# Patient Record
Sex: MALE | Race: WHITE | NOT HISPANIC OR LATINO | Employment: FULL TIME | URBAN - METROPOLITAN AREA
[De-identification: names, ages, dates, MRNs, and addresses within clinical notes are randomized per-mention and may not be internally consistent; named-entity substitution may affect disease eponyms.]

---

## 2018-12-10 ENCOUNTER — TELEPHONE (OUTPATIENT)
Dept: UROLOGY | Facility: MEDICAL CENTER | Age: 63
End: 2018-12-10

## 2018-12-10 NOTE — TELEPHONE ENCOUNTER
Complaint/Diagnosis:Frequency    Insurance:[employer]  [employer] [facility]    History of Cancer:NO    Previous Urologist:NO    Outside testing/where:NO    If yes,what kind:NO    Records requested/where:NO    Preferred location:[place]

## 2018-12-26 ENCOUNTER — OFFICE VISIT (OUTPATIENT)
Dept: UROLOGY | Facility: MEDICAL CENTER | Age: 63
End: 2018-12-26
Payer: COMMERCIAL

## 2018-12-26 VITALS
HEART RATE: 78 BPM | HEIGHT: 72 IN | DIASTOLIC BLOOD PRESSURE: 84 MMHG | BODY MASS INDEX: 31.15 KG/M2 | SYSTOLIC BLOOD PRESSURE: 146 MMHG | WEIGHT: 230 LBS

## 2018-12-26 DIAGNOSIS — R35.0 URINARY FREQUENCY: Primary | ICD-10-CM

## 2018-12-26 DIAGNOSIS — N20.0 RENAL STONE: ICD-10-CM

## 2018-12-26 DIAGNOSIS — R97.20 ELEVATED PSA: ICD-10-CM

## 2018-12-26 DIAGNOSIS — N13.8 BPH WITH OBSTRUCTION/LOWER URINARY TRACT SYMPTOMS: ICD-10-CM

## 2018-12-26 DIAGNOSIS — N40.1 BPH WITH OBSTRUCTION/LOWER URINARY TRACT SYMPTOMS: ICD-10-CM

## 2018-12-26 LAB
SL AMB  POCT GLUCOSE, UA: NEGATIVE
SL AMB LEUKOCYTE ESTERASE,UA: NEGATIVE
SL AMB POCT BILIRUBIN,UA: NEGATIVE
SL AMB POCT BLOOD,UA: ABNORMAL
SL AMB POCT CLARITY,UA: CLEAR
SL AMB POCT COLOR,UA: YELLOW
SL AMB POCT KETONES,UA: NEGATIVE
SL AMB POCT NITRITE,UA: NEGATIVE
SL AMB POCT PH,UA: 6.5
SL AMB POCT SPECIFIC GRAVITY,UA: 1.02
SL AMB POCT URINE PROTEIN: NEGATIVE
SL AMB POCT UROBILINOGEN: 0.2

## 2018-12-26 PROCEDURE — 99204 OFFICE O/P NEW MOD 45 MIN: CPT | Performed by: UROLOGY

## 2018-12-26 PROCEDURE — 81003 URINALYSIS AUTO W/O SCOPE: CPT | Performed by: UROLOGY

## 2018-12-26 RX ORDER — ERGOCALCIFEROL 1.25 MG/1
CAPSULE ORAL
Refills: 0 | COMMUNITY
Start: 2018-12-20

## 2018-12-26 RX ORDER — MELOXICAM 15 MG/1
15 TABLET ORAL DAILY PRN
Refills: 0 | COMMUNITY
Start: 2018-12-18

## 2018-12-26 NOTE — ASSESSMENT & PLAN NOTE
Cystoscopy planned  Discontinue alpha blockers  If it turns out that the patient has a distal ureteral stone, this will need to be addressed and his planned cystoscopy in the office can be performed in the hospital during treatment for his stone

## 2018-12-26 NOTE — LETTER
December 26, 2018     MD ANYA Michaud O  [facility]    Patient: Evans Jason   YOB: 1955   Date of Visit: 12/26/2018       Dear Dr Davie Bradley:    Thank you for referring Evans Jason to me for evaluation  Below are my notes for this consultation  If you have questions, please do not hesitate to call me  I look forward to following your patient along with you  Sincerely,        Campbell Stone MD        CC: No Recipients  Campbell Stone MD  12/26/2018  5:15 PM  Sign at close encounter  Assessment/Plan:    Renal stone  Considering his worsening urinary symptoms, left hydronephrosis seen on ultrasound, presence of a stone in the left kidney and stone history, the patient may have a distal ureteral stone contributing to his symptom complex  A CT stone study has been ordered  BPH with obstruction/lower urinary tract symptoms  Cystoscopy planned  Discontinue alpha blockers  If it turns out that the patient has a distal ureteral stone, this will need to be addressed and his planned cystoscopy in the office can be performed in the hospital during treatment for his stone  Elevated PSA  Repeat total and free PSA after primary problems have resolved  Diagnoses and all orders for this visit:    Urinary frequency  -     POCT urine dip auto non-scope    BPH with obstruction/lower urinary tract symptoms    Renal stone  -     CT renal stone study abdomen pelvis wo contrast; Future    Elevated PSA          Subjective:      Patient ID: Evans Jason is a 58 y o  male  HPI     Elevated PSA:  PSA was 4 1 in July and 4 26 in October  Initially, the patient was told by another urologist that he needed a biopsy  When the 2nd PSA result was available, the patient was told by his other urologist that his rate of rise of PSA is consistent with benign disease in that he does not have prostate cancer  The patient is left both concerned and confused      At this point, the patient's voiding cysts symptoms are higher priority and his mild elevation in PSA  A total and free PSA will be repeated after resolution of his current problems  BPH:  He notes urinary frequency, urinary urgency  He denies other significant urinary symptoms  He denies gross hematuria, urinary tract infections or incontinence  He is taking neither medications nor supplements for his symptoms  Major complaint is frequency  No pain  Fluids go right through him  Urgency is also interfering with ADL's  Sx for at least a year and probably 2, per family  Has had urge incontinence  Alpha blockers have helped sx but caused severe muscle and joint pain  Renal u/s showed PVR=77 cc and left hydro with left lower pole stone    Bladder scan was not performed because the machine is not working reliably  AUA SYMPTOM SCORE      Most Recent Value   AUA SYMPTOM SCORE   How often have you had a sensation of not emptying your bladder completely after you finished urinating? 0   How often have you had to urinate again less than two hours after you finished urinating? 4   How often have you found you stopped and started again several times when you urinate?  0   How often have you found it difficult to postpone urination? 2   How often have you had a weak urinary stream?  1   How often have you had to push or strain to begin urination? 0   How many times did you most typically get up to urinate from the time you went to bed at night until the time you got up in the morning? 2   Quality of Life: If you were to spend the rest of your life with your urinary condition just the way it is now, how would you feel about that?  5   AUA SYMPTOM SCORE  9          Renal Stone: The patient's stone was identified on a recent ultrasound  The patient has a remote history of renal colic    Considering his worsening urinary symptoms, left hydronephrosis seen on ultrasound, presence of a stone in the left kidney and stone history, the patient may have a distal ureteral stone contributing to his symptom complex  A CT stone study has been ordered  The patient is accompanied by his wife and daughter  The following portions of the patient's history were reviewed and updated as appropriate: allergies, current medications, past family history, past medical history, past social history, past surgical history and problem list     Review of Systems   Constitutional: Negative for activity change and fatigue  Respiratory: Negative for shortness of breath and wheezing  Cardiovascular: Negative for chest pain  Gastrointestinal: Negative for abdominal pain  Genitourinary: Negative for difficulty urinating, dysuria, frequency, hematuria and urgency  Musculoskeletal: Negative for back pain and gait problem  Skin: Negative  Allergic/Immunologic: Negative  Neurological: Negative  Psychiatric/Behavioral: Negative  Objective:      /84 (BP Location: Left arm, Patient Position: Sitting, Cuff Size: Standard)   Pulse 78   Ht 6' (1 829 m)   Wt 104 kg (230 lb)   BMI 31 19 kg/m²           Physical Exam   Constitutional: He is oriented to person, place, and time  He appears well-developed and well-nourished  HENT:   Head: Normocephalic and atraumatic  Eyes: EOM are normal    Neck: Normal range of motion  Neck supple  Cardiovascular: Normal rate, regular rhythm and normal heart sounds  Pulmonary/Chest: Effort normal and breath sounds normal    Abdominal: Soft  There is no tenderness  Genitourinary:   Genitourinary Comments: The prostate is approximately 40 g, firm, smooth, non-tender  Normal external genitalia  Musculoskeletal: Normal range of motion  Neurological: He is alert and oriented to person, place, and time  Skin: Skin is warm and dry  Psychiatric: He has a normal mood and affect   His behavior is normal  Judgment and thought content normal

## 2018-12-26 NOTE — PROGRESS NOTES
Assessment/Plan:    No problem-specific Assessment & Plan notes found for this encounter  {Assess/PlanSmartLinks:55242}      Subjective:      Patient ID: Chepe Jhaveri is a 58 y o  male      HPI    {Common ambulatory SmartLinks:15238}    Review of Systems      Objective:      /84 (BP Location: Left arm, Patient Position: Sitting, Cuff Size: Standard)   Pulse 78   Ht 6' (1 829 m)   Wt 104 kg (230 lb)   BMI 31 19 kg/m²          Physical Exam

## 2018-12-26 NOTE — PROGRESS NOTES
Assessment/Plan:    Renal stone  Considering his worsening urinary symptoms, left hydronephrosis seen on ultrasound, presence of a stone in the left kidney and stone history, the patient may have a distal ureteral stone contributing to his symptom complex  A CT stone study has been ordered  BPH with obstruction/lower urinary tract symptoms  Cystoscopy planned  Discontinue alpha blockers  If it turns out that the patient has a distal ureteral stone, this will need to be addressed and his planned cystoscopy in the office can be performed in the hospital during treatment for his stone  Elevated PSA  Repeat total and free PSA after primary problems have resolved  Diagnoses and all orders for this visit:    Urinary frequency  -     POCT urine dip auto non-scope    BPH with obstruction/lower urinary tract symptoms    Renal stone  -     CT renal stone study abdomen pelvis wo contrast; Future    Elevated PSA          Subjective:      Patient ID: Mj Bullock is a 58 y o  male  HPI     Elevated PSA:  PSA was 4 1 in July and 4 26 in October  Initially, the patient was told by another urologist that he needed a biopsy  When the 2nd PSA result was available, the patient was told by his other urologist that his rate of rise of PSA is consistent with benign disease in that he does not have prostate cancer  The patient is left both concerned and confused  At this point, the patient's voiding cysts symptoms are higher priority and his mild elevation in PSA  A total and free PSA will be repeated after resolution of his current problems  BPH:  He notes urinary frequency, urinary urgency  He denies other significant urinary symptoms  He denies gross hematuria, urinary tract infections or incontinence  He is taking neither medications nor supplements for his symptoms  Major complaint is frequency  No pain  Fluids go right through him  Urgency is also interfering with ADL's    Sx for at least a year and probably 2, per family  Has had urge incontinence  Alpha blockers have helped sx but caused severe muscle and joint pain  Renal u/s showed PVR=77 cc and left hydro with left lower pole stone    Bladder scan was not performed because the machine is not working reliably  AUA SYMPTOM SCORE      Most Recent Value   AUA SYMPTOM SCORE   How often have you had a sensation of not emptying your bladder completely after you finished urinating? 0   How often have you had to urinate again less than two hours after you finished urinating? 4   How often have you found you stopped and started again several times when you urinate?  0   How often have you found it difficult to postpone urination? 2   How often have you had a weak urinary stream?  1   How often have you had to push or strain to begin urination? 0   How many times did you most typically get up to urinate from the time you went to bed at night until the time you got up in the morning? 2   Quality of Life: If you were to spend the rest of your life with your urinary condition just the way it is now, how would you feel about that?  5   AUA SYMPTOM SCORE  9          Renal Stone: The patient's stone was identified on a recent ultrasound  The patient has a remote history of renal colic  Considering his worsening urinary symptoms, left hydronephrosis seen on ultrasound, presence of a stone in the left kidney and stone history, the patient may have a distal ureteral stone contributing to his symptom complex  A CT stone study has been ordered  The patient is accompanied by his wife and daughter  The following portions of the patient's history were reviewed and updated as appropriate: allergies, current medications, past family history, past medical history, past social history, past surgical history and problem list     Review of Systems   Constitutional: Negative for activity change and fatigue     Respiratory: Negative for shortness of breath and wheezing  Cardiovascular: Negative for chest pain  Gastrointestinal: Negative for abdominal pain  Genitourinary: Negative for difficulty urinating, dysuria, frequency, hematuria and urgency  Musculoskeletal: Negative for back pain and gait problem  Skin: Negative  Allergic/Immunologic: Negative  Neurological: Negative  Psychiatric/Behavioral: Negative  Objective:      /84 (BP Location: Left arm, Patient Position: Sitting, Cuff Size: Standard)   Pulse 78   Ht 6' (1 829 m)   Wt 104 kg (230 lb)   BMI 31 19 kg/m²          Physical Exam   Constitutional: He is oriented to person, place, and time  He appears well-developed and well-nourished  HENT:   Head: Normocephalic and atraumatic  Eyes: EOM are normal    Neck: Normal range of motion  Neck supple  Cardiovascular: Normal rate, regular rhythm and normal heart sounds  Pulmonary/Chest: Effort normal and breath sounds normal    Abdominal: Soft  There is no tenderness  Genitourinary:   Genitourinary Comments: The prostate is approximately 40 g, firm, smooth, non-tender  Normal external genitalia  Musculoskeletal: Normal range of motion  Neurological: He is alert and oriented to person, place, and time  Skin: Skin is warm and dry  Psychiatric: He has a normal mood and affect   His behavior is normal  Judgment and thought content normal

## 2018-12-26 NOTE — ASSESSMENT & PLAN NOTE
Considering his worsening urinary symptoms, left hydronephrosis seen on ultrasound, presence of a stone in the left kidney and stone history, the patient may have a distal ureteral stone contributing to his symptom complex  A CT stone study has been ordered

## 2019-01-12 ENCOUNTER — HOSPITAL ENCOUNTER (OUTPATIENT)
Dept: CT IMAGING | Facility: HOSPITAL | Age: 64
Discharge: HOME | End: 2019-01-12
Attending: UROLOGY
Payer: COMMERCIAL

## 2019-01-12 DIAGNOSIS — N20.0 RENAL STONE: ICD-10-CM

## 2019-01-12 PROCEDURE — 74176 CT ABD & PELVIS W/O CONTRAST: CPT

## 2019-01-14 ENCOUNTER — TELEPHONE (OUTPATIENT)
Dept: UROLOGY | Facility: MEDICAL CENTER | Age: 64
End: 2019-01-14

## 2019-01-14 NOTE — TELEPHONE ENCOUNTER
Spoke with the patient; Informed him of the non-obstructing 2mm stone in his left kidney as well as the cyst  Both are okay per Dr Octavio Castro  Date and time of patient's next appointment have been confirmed

## 2019-01-14 NOTE — TELEPHONE ENCOUNTER
----- Message from Jaylene Lam MD sent at 1/14/2019  2:25 PM EST -----  CT stones for stones shows a 2 mm stone in the lower pole of the left kidney  There is no obstruction  There is a cyst on the kidney which is not clinically important however, because of its location, it was mistaken for a sign of kidney obstruction  This happen sometimes  Inform pt

## 2019-01-29 ENCOUNTER — PROCEDURE VISIT (OUTPATIENT)
Dept: UROLOGY | Facility: MEDICAL CENTER | Age: 64
End: 2019-01-29
Payer: COMMERCIAL

## 2019-01-29 VITALS
DIASTOLIC BLOOD PRESSURE: 82 MMHG | HEART RATE: 73 BPM | SYSTOLIC BLOOD PRESSURE: 148 MMHG | BODY MASS INDEX: 30.48 KG/M2 | WEIGHT: 225 LBS | HEIGHT: 72 IN

## 2019-01-29 DIAGNOSIS — R35.0 URINARY FREQUENCY: ICD-10-CM

## 2019-01-29 DIAGNOSIS — Z71.89 OTHER SPECIFIED COUNSELING: ICD-10-CM

## 2019-01-29 DIAGNOSIS — N20.0 CALCULUS OF KIDNEY: ICD-10-CM

## 2019-01-29 DIAGNOSIS — R97.20 ELEVATED PSA: ICD-10-CM

## 2019-01-29 DIAGNOSIS — N40.1 BPH WITH OBSTRUCTION/LOWER URINARY TRACT SYMPTOMS: Primary | ICD-10-CM

## 2019-01-29 DIAGNOSIS — N13.8 BPH WITH OBSTRUCTION/LOWER URINARY TRACT SYMPTOMS: Primary | ICD-10-CM

## 2019-01-29 LAB
SL AMB  POCT GLUCOSE, UA: NEGATIVE
SL AMB LEUKOCYTE ESTERASE,UA: 8
SL AMB POCT BILIRUBIN,UA: NEGATIVE
SL AMB POCT BLOOD,UA: NEGATIVE
SL AMB POCT CLARITY,UA: CLEAR
SL AMB POCT COLOR,UA: YELLOW
SL AMB POCT KETONES,UA: NEGATIVE
SL AMB POCT NITRITE,UA: NEGATIVE
SL AMB POCT PH,UA: 1.01
SL AMB POCT SPECIFIC GRAVITY,UA: 1.01
SL AMB POCT URINE PROTEIN: NORMAL
SL AMB POCT UROBILINOGEN: 0.2

## 2019-01-29 PROCEDURE — 52000 CYSTOURETHROSCOPY: CPT | Performed by: UROLOGY

## 2019-01-29 PROCEDURE — 81003 URINALYSIS AUTO W/O SCOPE: CPT | Performed by: UROLOGY

## 2019-01-29 PROCEDURE — 99214 OFFICE O/P EST MOD 30 MIN: CPT | Performed by: UROLOGY

## 2019-01-29 NOTE — PROGRESS NOTES
Assessment/Plan:    Elevated PSA  Obtain total and free PSA now  BPH with obstruction/lower urinary tract symptoms  Symptoms were actually less since discontinuing tamsulosin  CPAP should help with the nocturia and decreasing his fluid intake and caffeine intake should help with the urinary frequency  Overall, the patient is much less symptomatic than most of his peers  Calculus of kidney  2 millimeter left renal stone which appears to be imbedded in tissue  No treatment indicated  Diagnoses and all orders for this visit:    BPH with obstruction/lower urinary tract symptoms    Calculus of kidney    Elevated PSA  -     PSA, total and free; Future    Urinary frequency  -     POCT urine dip auto non-scope    Other specified counseling          Subjective:      Patient ID: Deric Westbrook is a 58 y o  male  HPI  BPH:  He notes nocturia x 2 and daytime frequency  He denies other significant urinary symptoms  He denies gross hematuria, urinary tract infections or incontinence  He is taking neither medications nor supplements for his symptoms  Major complaint is nocturia  Getting CPAP machine this week  Sleep apnea may contribute to sleep apnea and the CPAP machine may help a lot  Drinks 2- 20 oz cups of caffeinated coffee daily and has frequency  This can certainly based upon his caffeine intake and sheer volume alone  AUA SYMPTOM SCORE      Most Recent Value   AUA SYMPTOM SCORE   How often have you had a sensation of not emptying your bladder completely after you finished urinating? 0   How often have you had to urinate again less than two hours after you finished urinating? 2   How often have you found you stopped and started again several times when you urinate? 1   How often have you found it difficult to postpone urination? 1   How often have you had a weak urinary stream?  1   How often have you had to push or strain to begin urination?   0   How many times did you most typically get up to urinate from the time you went to bed at night until the time you got up in the morning? 2   Quality of Life: If you were to spend the rest of your life with your urinary condition just the way it is now, how would you feel about that?  5   AUA SYMPTOM SCORE  7            Procedures  MALE FLEXIBLE CYSTOSCOPY    Preoperative diagnosis:  Difficulty voiding    Postoperative diagnosis:  Same, mild BPH    Operation:  Flexible cystoscopy    Surgeon:  Mary Kowalski MD,FACS    Anesthesia:  Xylocaine jelly    Procedure:  Patient was brought to the cystoscopy room and positively identified  The patient was prepped and draped in sterile fashion  Ten mL of 1% xylocaine jelly was instilled into the urethra  A penile clamp was applied  The flexible cystoscope was inserted  Findings are as follows:    Penile Urethra:  normal  Bulbar Urethra:  normal  Prostate:  Symmetrical enlargement of the lateral lobes with obstruction for approximately 1 cm  Bladder:   Bladder Neck:  Normal  Ureteral orifices:   Normal  Mucosa:   Normal     Trabeculation:  mild   PVR:  Minimal      The cystoscope was removed  The patient tolerated the procedure well  Following additional consultation to review the findings with the patient, he was discharged from the office in satisfactory condition  Impression:  Mild BPH  Renal stone:  2 mm nonobstructing stone in the left lower pole  No treatment needed  No convincing evidence of hydronephrosis by CT scan, suspected parapelvic cyst which may have led to the interpretation of hydronephrosis on ultrasound  Elevated PSA:  Notes from office visit of December 26, 2018:  PSA was 4 1 in July and 4 26 in October  Initially, the patient was told by another urologist that he needed a biopsy  When the 2nd PSA result was available, the patient was told by his other urologist that his rate of rise of PSA is consistent with benign disease in that he does not have prostate cancer  The patient is left both concerned and confused  Based upon previous records from the patient's former urologist, his PSA was 1 46 in January 2015  Calculated PSA doubling time is 2 0 4 years  Without intervening lab work, it is difficult to know whether the patient's PSA is rising in a linear or geometric rate  We will obtain a total and free PSA and go from there  The patient is accompanied by his wife for the consultation portion of the visit which was more than 50 percent of the total visit  The following portions of the patient's history were reviewed and updated as appropriate: allergies, current medications, past family history, past medical history, past social history, past surgical history and problem list     Review of Systems   Constitutional: Negative for activity change and fatigue  Respiratory: Negative for shortness of breath and wheezing  Cardiovascular: Negative for chest pain  Gastrointestinal: Negative for abdominal pain  Genitourinary: Negative for difficulty urinating, dysuria, frequency, hematuria and urgency  Musculoskeletal: Negative for back pain and gait problem  Skin: Negative  Allergic/Immunologic: Negative  Neurological: Negative  Psychiatric/Behavioral: Negative  Objective:      /82 (BP Location: Left arm, Patient Position: Sitting, Cuff Size: Standard)   Pulse 73   Ht 6' (1 829 m)   Wt 102 kg (225 lb)   BMI 30 52 kg/m²          Physical Exam   Constitutional: He is oriented to person, place, and time  He appears well-developed and well-nourished  HENT:   Head: Normocephalic and atraumatic  Eyes: EOM are normal    Neck: Normal range of motion  Pulmonary/Chest: Effort normal    Genitourinary:   Genitourinary Comments: Normal external genitalia  Musculoskeletal: Normal range of motion  Neurological: He is alert and oriented to person, place, and time  Skin: Skin is warm and dry     Psychiatric: He has a normal mood and affect   His behavior is normal  Judgment and thought content normal

## 2019-01-29 NOTE — ASSESSMENT & PLAN NOTE
Symptoms were actually less since discontinuing tamsulosin  CPAP should help with the nocturia and decreasing his fluid intake and caffeine intake should help with the urinary frequency  Overall, the patient is much less symptomatic than most of his peers

## 2019-01-29 NOTE — LETTER
January 29, 2019     MD ANYA [facility]    Patient: Denise Hernandes   YOB: 1955   Date of Visit: 1/29/2019       Dear Dr Wander Mcmullen:    Thank you for referring Denise Hernandes to me for evaluation  Below are my notes for this consultation  If you have questions, please do not hesitate to call me  I look forward to following your patient along with you  Sincerely,        Lisa Henderson MD        CC: No Recipients  Lisa Henderson MD  1/29/2019 12:39 PM  Sign at close encounter  Assessment/Plan:    Elevated PSA  Obtain total and free PSA now  BPH with obstruction/lower urinary tract symptoms  Symptoms were actually less since discontinuing tamsulosin  CPAP should help with the nocturia and decreasing his fluid intake and caffeine intake should help with the urinary frequency  Overall, the patient is much less symptomatic than most of his peers  Calculus of kidney  2 millimeter left renal stone which appears to be imbedded in tissue  No treatment indicated  Diagnoses and all orders for this visit:    BPH with obstruction/lower urinary tract symptoms    Calculus of kidney    Elevated PSA  -     PSA, total and free; Future    Urinary frequency  -     POCT urine dip auto non-scope    Other specified counseling          Subjective:      Patient ID: Denise Hernandes is a 58 y o  male  HPI  BPH:  He notes nocturia x 2 and daytime frequency  He denies other significant urinary symptoms  He denies gross hematuria, urinary tract infections or incontinence  He is taking neither medications nor supplements for his symptoms  Major complaint is nocturia  Getting CPAP machine this week  Sleep apnea may contribute to sleep apnea and the CPAP machine may help a lot  Drinks 2- 20 oz cups of caffeinated coffee daily and has frequency  This can certainly based upon his caffeine intake and sheer volume alone      AUA SYMPTOM SCORE      Most Recent Value   AUA SYMPTOM SCORE   How often have you had a sensation of not emptying your bladder completely after you finished urinating? 0   How often have you had to urinate again less than two hours after you finished urinating? 2   How often have you found you stopped and started again several times when you urinate? 1   How often have you found it difficult to postpone urination? 1   How often have you had a weak urinary stream?  1   How often have you had to push or strain to begin urination? 0   How many times did you most typically get up to urinate from the time you went to bed at night until the time you got up in the morning? 2   Quality of Life: If you were to spend the rest of your life with your urinary condition just the way it is now, how would you feel about that?  5   AUA SYMPTOM SCORE  7            Procedures  MALE FLEXIBLE CYSTOSCOPY    Preoperative diagnosis:  Difficulty voiding    Postoperative diagnosis:  Same, mild BPH    Operation:  Flexible cystoscopy    Surgeon:  Comfort Jean MD,FACS    Anesthesia:  Xylocaine jelly    Procedure:  Patient was brought to the cystoscopy room and positively identified  The patient was prepped and draped in sterile fashion  Ten mL of 1% xylocaine jelly was instilled into the urethra  A penile clamp was applied  The flexible cystoscope was inserted  Findings are as follows:    Penile Urethra:  normal  Bulbar Urethra:  normal  Prostate:  Symmetrical enlargement of the lateral lobes with obstruction for approximately 1 cm  Bladder:   Bladder Neck:  Normal  Ureteral orifices:   Normal  Mucosa:   Normal     Trabeculation:  mild   PVR:  Minimal      The cystoscope was removed  The patient tolerated the procedure well  Following additional consultation to review the findings with the patient, he was discharged from the office in satisfactory condition  Impression:  Mild BPH  Renal stone:  2 mm nonobstructing stone in the left lower pole    No treatment needed  No convincing evidence of hydronephrosis by CT scan, suspected parapelvic cyst which may have led to the interpretation of hydronephrosis on ultrasound  Elevated PSA:  Notes from office visit of December 26, 2018:  PSA was 4 1 in July and 4 26 in October  Initially, the patient was told by another urologist that he needed a biopsy  When the 2nd PSA result was available, the patient was told by his other urologist that his rate of rise of PSA is consistent with benign disease in that he does not have prostate cancer  The patient is left both concerned and confused  Based upon previous records from the patient's former urologist, his PSA was 1 46 in January 2015  Calculated PSA doubling time is 2 0 4 years  Without intervening lab work, it is difficult to know whether the patient's PSA is rising in a linear or geometric rate  We will obtain a total and free PSA and go from there  The patient is accompanied by his wife for the consultation portion of the visit which was more than 50 percent of the total visit  The following portions of the patient's history were reviewed and updated as appropriate: allergies, current medications, past family history, past medical history, past social history, past surgical history and problem list     Review of Systems   Constitutional: Negative for activity change and fatigue  Respiratory: Negative for shortness of breath and wheezing  Cardiovascular: Negative for chest pain  Gastrointestinal: Negative for abdominal pain  Genitourinary: Negative for difficulty urinating, dysuria, frequency, hematuria and urgency  Musculoskeletal: Negative for back pain and gait problem  Skin: Negative  Allergic/Immunologic: Negative  Neurological: Negative  Psychiatric/Behavioral: Negative            Objective:      /82 (BP Location: Left arm, Patient Position: Sitting, Cuff Size: Standard)   Pulse 73   Ht 6' (1 829 m)   Wt 102 kg (225 lb) BMI 30 52 kg/m²           Physical Exam   Constitutional: He is oriented to person, place, and time  He appears well-developed and well-nourished  HENT:   Head: Normocephalic and atraumatic  Eyes: EOM are normal    Neck: Normal range of motion  Pulmonary/Chest: Effort normal    Genitourinary:   Genitourinary Comments: Normal external genitalia  Musculoskeletal: Normal range of motion  Neurological: He is alert and oriented to person, place, and time  Skin: Skin is warm and dry  Psychiatric: He has a normal mood and affect   His behavior is normal  Judgment and thought content normal

## 2019-02-15 ENCOUNTER — TELEPHONE (OUTPATIENT)
Dept: UROLOGY | Facility: MEDICAL CENTER | Age: 64
End: 2019-02-15

## 2019-02-15 NOTE — TELEPHONE ENCOUNTER
[facility] called to see if fax was received for patient labs  Did not see it scanned in so [facility] sent labs    Took to clinical team

## 2019-02-18 DIAGNOSIS — R97.20 ELEVATED PSA: Primary | ICD-10-CM

## 2019-02-18 RX ORDER — DOXYCYCLINE 100 MG/1
100 CAPSULE ORAL 2 TIMES DAILY
Qty: 28 CAPSULE | Refills: 0 | Status: SHIPPED | OUTPATIENT
Start: 2019-02-18 | End: 2019-03-04

## 2019-02-18 NOTE — TELEPHONE ENCOUNTER
Kt Borrego MD  [facility] [facility]             PSA nl   Inform pt   Current PSA is 9 2, 20% free, 20% chance of prostate cancer   We have no prior PSAs for comparison   Please ask the patient if he has any PSAs from any other source or doctor   If not, this PSA needs to be repeated in about 1 month following a 2 week course of doxycycline   Labs and antibiotics ordered  Adrian Multani you  Pt notified of PSA results  Understood to finish 2 week course of antibiotics then wait 2 weeks to repeat PSA at [facility]

## 2019-03-19 ENCOUNTER — APPOINTMENT (OUTPATIENT)
Dept: LAB | Facility: CLINIC | Age: 64
End: 2019-03-19
Payer: COMMERCIAL

## 2019-03-19 ENCOUNTER — TRANSCRIBE ORDERS (OUTPATIENT)
Dept: ADMINISTRATIVE | Facility: HOSPITAL | Age: 64
End: 2019-03-19

## 2019-03-19 DIAGNOSIS — R97.20 ELEVATED PSA: ICD-10-CM

## 2019-03-19 PROCEDURE — 84153 ASSAY OF PSA TOTAL: CPT

## 2019-03-19 PROCEDURE — 84154 ASSAY OF PSA FREE: CPT

## 2019-03-19 PROCEDURE — 36415 COLL VENOUS BLD VENIPUNCTURE: CPT

## 2019-03-20 LAB
PSA FREE MFR SERPL: 14.6 %
PSA FREE SERPL-MCNC: 0.6 NG/ML
PSA SERPL-MCNC: 4.1 NG/ML (ref 0–4)

## 2019-03-21 ENCOUNTER — TELEPHONE (OUTPATIENT)
Dept: UROLOGY | Facility: CLINIC | Age: 64
End: 2019-03-21

## 2019-03-22 NOTE — TELEPHONE ENCOUNTER
Pt notified PSA is 4 1, was 9 2 in Feb  Pt now questioning when he should return to office  Will direct to Dr Pantera Mora

## 2019-03-25 ENCOUNTER — TELEPHONE (OUTPATIENT)
Dept: UROLOGY | Facility: MEDICAL CENTER | Age: 64
End: 2019-03-25

## 2019-03-25 NOTE — TELEPHONE ENCOUNTER
Notified pt of PSA and ct results  Per Dr Willingham Friends he would like pt to have a cysto  Next available is 4/30 at 830   Pt scheduled appt for that time

## 2019-03-25 NOTE — TELEPHONE ENCOUNTER
----- Message from Celestino Porras MD sent at 3/23/2019 11:09 AM EDT -----  The patient's PSA has been stable for the past year at 4 1  Although this is mildly abnormal, it has been stable  I do not think he needs a prostate biopsy now  I reviewed his CT stone study  He has a 2 mm stone in the left kidney which appears to be imbedded in the tissue and not free to move or cause trouble  In order to evaluate his urinary symptoms, I would like to perform cystoscopy in the office  Please inform the patient and arrange this  Thank you

## 2019-03-29 NOTE — TELEPHONE ENCOUNTER
Spoke to pt  Per Dr Imelda Barthel, pt was scoped in January of this year  No need to scope in April since PSA has returned to base level Pt reports he still has freq at times q 1 hour in which cold aggravates the symptom and recently dev nocturia x's 1  Will direct to Dr Imelda Barthel re: treatment

## 2019-04-03 ENCOUNTER — TELEPHONE (OUTPATIENT)
Dept: UROLOGY | Facility: CLINIC | Age: 64
End: 2019-04-03

## 2021-08-17 ENCOUNTER — TELEPHONE (OUTPATIENT)
Dept: UROLOGY | Facility: CLINIC | Age: 66
End: 2021-08-17

## 2021-08-17 NOTE — TELEPHONE ENCOUNTER
Patient of Dr Shailesh Apodaca, last seen in 4/2019, left message on voicemail line, needing to make an appointment for follow up care and asking if he any labs done

## 2021-08-17 NOTE — TELEPHONE ENCOUNTER
When I called back, patient had no clue why Anastacio Micheal called for him  Patient has not been seen since 04/2019  I made him a follow up appointment with Dr Reena Rashid on 10/01, and patient wants to know if he should get a PSA done? If he needs one done please place order and let patient know

## 2021-08-19 ENCOUNTER — TELEPHONE (OUTPATIENT)
Dept: UROLOGY | Facility: MEDICAL CENTER | Age: 66
End: 2021-08-19

## 2021-08-19 DIAGNOSIS — R97.20 ELEVATED PSA: Primary | ICD-10-CM

## 2021-08-19 NOTE — TELEPHONE ENCOUNTER
Spoke to pt and advised he go for PSA test   Order placed in chart  Advised he would be called with results    He has appt on 10/1/21 with Dr Da Roman

## 2021-09-13 ENCOUNTER — TELEPHONE (OUTPATIENT)
Dept: UROLOGY | Facility: MEDICAL CENTER | Age: 66
End: 2021-09-13

## 2021-11-02 ENCOUNTER — TELEPHONE (OUTPATIENT)
Dept: UROLOGY | Facility: MEDICAL CENTER | Age: 66
End: 2021-11-02

## 2021-11-24 ENCOUNTER — APPOINTMENT (OUTPATIENT)
Dept: LAB | Facility: CLINIC | Age: 66
End: 2021-11-24
Payer: COMMERCIAL

## 2021-11-24 DIAGNOSIS — R97.20 ELEVATED PSA: ICD-10-CM

## 2021-11-24 LAB — PSA SERPL-MCNC: 3.5 NG/ML (ref 0–4)

## 2021-11-24 PROCEDURE — 84153 ASSAY OF PSA TOTAL: CPT

## 2021-11-29 ENCOUNTER — OFFICE VISIT (OUTPATIENT)
Dept: UROLOGY | Facility: MEDICAL CENTER | Age: 66
End: 2021-11-29
Payer: COMMERCIAL

## 2021-11-29 VITALS
HEART RATE: 73 BPM | BODY MASS INDEX: 30.94 KG/M2 | DIASTOLIC BLOOD PRESSURE: 68 MMHG | HEIGHT: 71 IN | SYSTOLIC BLOOD PRESSURE: 120 MMHG | WEIGHT: 221 LBS

## 2021-11-29 DIAGNOSIS — N40.0 BENIGN PROSTATIC HYPERPLASIA WITHOUT LOWER URINARY TRACT SYMPTOMS: ICD-10-CM

## 2021-11-29 DIAGNOSIS — R97.20 ELEVATED PSA: Primary | ICD-10-CM

## 2021-11-29 LAB
SL AMB  POCT GLUCOSE, UA: NORMAL
SL AMB LEUKOCYTE ESTERASE,UA: NORMAL
SL AMB POCT BILIRUBIN,UA: NORMAL
SL AMB POCT BLOOD,UA: NORMAL
SL AMB POCT CLARITY,UA: CLEAR
SL AMB POCT COLOR,UA: YELLOW
SL AMB POCT KETONES,UA: NORMAL
SL AMB POCT NITRITE,UA: NORMAL
SL AMB POCT PH,UA: 7
SL AMB POCT SPECIFIC GRAVITY,UA: 1.03
SL AMB POCT URINE PROTEIN: NORMAL
SL AMB POCT UROBILINOGEN: 0.2

## 2021-11-29 PROCEDURE — 99214 OFFICE O/P EST MOD 30 MIN: CPT | Performed by: UROLOGY

## 2021-11-29 PROCEDURE — 81003 URINALYSIS AUTO W/O SCOPE: CPT | Performed by: UROLOGY

## 2021-11-29 RX ORDER — MELATONIN
1000 DAILY
COMMUNITY

## 2021-11-29 RX ORDER — RIBOFLAVIN (VITAMIN B2) 100 MG
100 TABLET ORAL DAILY
COMMUNITY

## 2021-12-09 PROBLEM — N40.0 BENIGN PROSTATIC HYPERPLASIA WITHOUT LOWER URINARY TRACT SYMPTOMS: Status: ACTIVE | Noted: 2018-12-26

## 2022-02-23 ENCOUNTER — OFFICE VISIT (OUTPATIENT)
Dept: URGENT CARE | Facility: CLINIC | Age: 67
End: 2022-02-23
Payer: COMMERCIAL

## 2022-02-23 ENCOUNTER — APPOINTMENT (OUTPATIENT)
Dept: RADIOLOGY | Facility: CLINIC | Age: 67
End: 2022-02-23
Payer: COMMERCIAL

## 2022-02-23 VITALS
BODY MASS INDEX: 32.2 KG/M2 | DIASTOLIC BLOOD PRESSURE: 84 MMHG | TEMPERATURE: 97.6 F | OXYGEN SATURATION: 95 % | HEART RATE: 68 BPM | WEIGHT: 230 LBS | RESPIRATION RATE: 18 BRPM | HEIGHT: 71 IN | SYSTOLIC BLOOD PRESSURE: 130 MMHG

## 2022-02-23 DIAGNOSIS — M25.531 RIGHT WRIST PAIN: Primary | ICD-10-CM

## 2022-02-23 DIAGNOSIS — M25.531 RIGHT WRIST PAIN: ICD-10-CM

## 2022-02-23 PROCEDURE — 99203 OFFICE O/P NEW LOW 30 MIN: CPT | Performed by: EMERGENCY MEDICINE

## 2022-02-23 PROCEDURE — 73110 X-RAY EXAM OF WRIST: CPT

## 2022-02-23 RX ORDER — PREDNISONE 10 MG/1
TABLET ORAL
Qty: 27 TABLET | Refills: 0 | Status: SHIPPED | OUTPATIENT
Start: 2022-02-23

## 2022-02-23 RX ORDER — PROPRANOLOL/HYDROCHLOROTHIAZID 40 MG-25MG
500 TABLET ORAL DAILY
COMMUNITY

## 2022-02-23 NOTE — PATIENT INSTRUCTIONS
If you are diabetic you should adhere strictly to your diabetic diet and monitor blood sugar closely while on prednisone and you should discontinue the prednisone if blood sugar becomes significantly elevated  Avoid nonsteroidal anti-inflammatories like Advil or Aleve while on prednisone  Wrist Sprain   WHAT YOU NEED TO KNOW:   A wrist sprain happens when one or more ligaments in your wrist stretch or tear  Ligaments are tough tissues that connect bones and keep them in place, and support your joints  DISCHARGE INSTRUCTIONS:   Return to the emergency department if:   · You have severe pain or swelling  · Your injured wrist is red or has red streaks spreading from the injured area  · You have new trouble moving your hands, fingers, or wrist     · Your wrist, hand, or fingers feel cold or numb  · Your fingernails turn blue or gray  Call your doctor if:   · Your symptoms get worse  · You have pain and swelling for more than 48 hours  · You have questions or concerns about your condition or care  Medicines: You may need any of the following:  · NSAIDs , such as ibuprofen, help decrease swelling, pain, and fever  NSAIDs can cause stomach bleeding or kidney problems in certain people  If you take blood thinner medicine, always ask your healthcare provider if NSAIDs are safe for you  Always read the medicine label and follow directions  · Acetaminophen  decreases pain and fever  It is available without a doctor's order  Ask how much to take and how often to take it  Follow directions  Read the labels of all other medicines you are using to see if they also contain acetaminophen, or ask your doctor or pharmacist  Acetaminophen can cause liver damage if not taken correctly  Do not use more than 4 grams (4,000 milligrams) total of acetaminophen in one day  · Take your medicine as directed    Contact your healthcare provider if you think your medicine is not helping or if you have side effects  Tell him or her if you are allergic to any medicine  Keep a list of the medicines, vitamins, and herbs you take  Include the amounts, and when and why you take them  Bring the list or the pill bottles to follow-up visits  Carry your medicine list with you in case of an emergency  Self-care:   · Rest  your wrist for at least 48 hours  Avoid activities that cause pain  · Ice  your wrist for 15 to 20 minutes every hour or as directed  Use an ice pack, or put crushed ice in a plastic bag  Cover it with a towel before you put it on your wrist  Ice helps prevent tissue damage and decreases swelling and pain  · Compress  your wrist with an elastic bandage  This will help decrease swelling, support your wrist, and help it heal  Wear your wrist wrap as directed  The elastic bandage should be snug but not tight  · Elevate  your wrist above the level of your heart as often as you can  This will help decrease swelling and pain  Prop your wrist on pillows or blankets to keep it elevated comfortably  Wrist support: You may need to wear a splint or cast to support your wrist and prevent more damage  Wear your splint as directed  Ask for instructions on how to bathe while you are wearing a splint or cast   Physical therapy:  Your healthcare provider may recommend that you go to physical therapy  A physical therapist teaches you exercises to help improve movement and strength, and to decrease pain  Follow up with your doctor as directed:  Write down your questions so you remember to ask them during your visits  © Copyright [employer] 2021 Information is for End User's use only and may not be sold, redistributed or otherwise used for commercial purposes  All illustrations and images included in CareNotes® are the copyrighted property of [employer]  or Andres Dooley   The above information is an  only   It is not intended as medical advice for individual conditions or treatments  Talk to your doctor, nurse or pharmacist before following any medical regimen to see if it is safe and effective for you  Tendinitis   WHAT YOU NEED TO KNOW:   Tendinitis is painful inflammation or breakdown of your tendons  It may also be called tendinopathy  Tendinitis often occurs in the knee, shoulder, ankle, hip, or elbow  DISCHARGE INSTRUCTIONS:   Medicines:   · Pain medicines  such as acetaminophen or NSAIDs may decrease swelling and pain or fever  These medicines are available without a doctor's order  Ask which medicine to take  Ask how much to take and when to take it  Follow directions  Acetaminophen and NSAIDs can cause liver or kidney damage if not taken correctly  If you take blood thinner medicine, always ask your healthcare provider if NSAIDs are safe for you  Always read the medicine label and follow the directions on it before using these medicine  · Take your medicine as directed  Contact your healthcare provider if you think your medicine is not helping or if you have side effects  Tell him if you are allergic to any medicine  Keep a list of the medicines, vitamins, and herbs you take  Include the amounts, and when and why you take them  Bring the list or the pill bottles to follow-up visits  Carry your medicine list with you in case of an emergency  Management:   · Rest  your tendon as directed to help it heal  Ask your healthcare provider if you need to stop putting weight on your affected area  · Ice  helps decrease swelling and pain  Ice may also help prevent tissue damage  Use an ice pack, or put crushed ice in a plastic bag  Cover it with a towel and place it on the affected area for 10 to 15 minutes every hour or as directed  · Support devices  such as a cane, splint, shoe insert, or brace may help reduce your pain  · Physical therapy  may be ordered by your healthcare provider   This may be used to teach you exercises to help improve movement and strength, and to decrease pain  You may also learn how to improve your posture, and how to lift or exercise correctly  Prevention:   · Stretch and warm up  before you exercise  · Exercise regularly  to strengthen the muscles around your joint  Ease into an exercise routine for the first 3 weeks to prevent another injury  Ask your healthcare provider about the best exercise plan for you  Rest fully between activities  · Use the right equipment  for sports and exercise  Wear braces or tape around weak joints as directed  Follow up with your healthcare provider as directed:  Write down your questions so you remember to ask them during your visits  Contact your healthcare provider if:   · You have increased pain even after you take medicine  · You have questions or concerns about your condition or care  Return to the emergency department if:   · You have increased redness over the joint, or swelling in the joint  · You suddenly cannot move your joint  © Copyright [employer] 2021 Information is for End User's use only and may not be sold, redistributed or otherwise used for commercial purposes  All illustrations and images included in CareNotes® are the copyrighted property of A D A M , Inc  or [facility] Marlon Dooley   The above information is an  only  It is not intended as medical advice for individual conditions or treatments  Talk to your doctor, nurse or pharmacist before following any medical regimen to see if it is safe and effective for you

## 2022-02-23 NOTE — PROGRESS NOTES
[address] [facility]        NAME: Charlee Villagomez is a 78 y o  male  : 1955    MRN: [id number]  DATE: 2022  TIME: 8:23 AM    Assessment and Plan   Right wrist pain [M25 531]  1  Right wrist pain  XR wrist 3+ vw right         Patient Instructions     There are no Patient Instructions on file for this visit  Follow up with PCP in 3-5 days  Proceed to  ER if symptoms worsen  Chief Complaint     Chief Complaint   Patient presents with    Wrist Pain     C/O pain Rt wrist, injured using a drill, wrist was twisted  Incident yesterday  + edema of same  History of Present Illness       Patient complains of right wrist and hand pain and swelling since twisting injury to same yesterday while using a drill  Review of Systems   Review of Systems   Constitutional: Negative for chills and fever  Musculoskeletal: Positive for arthralgias  Negative for gait problem, joint swelling and myalgias  Skin: Negative for color change, rash and wound  Neurological: Negative for numbness           Current Medications       Current Outpatient Medications:     Ascorbic Acid (vitamin C) 100 MG tablet, Take 100 mg by mouth daily, Disp: , Rfl:     cholecalciferol (VITAMIN D3) 1,000 units tablet, Take 1,000 Units by mouth daily, Disp: , Rfl:     Turmeric (QC Tumeric Complex) 500 MG CAPS, Take 500 mg by mouth in the morning, Disp: , Rfl:     Zinc Sulfate (ZINC 15 PO), Take by mouth, Disp: , Rfl:     ergocalciferol (VITAMIN D2) 50,000 units, TAKE 1 CAPSULE ORALLY ONCE PER WEEK (Patient not taking: Reported on 2021), Disp: , Rfl: 0    meloxicam (MOBIC) 15 mg tablet, Take 15 mg by mouth daily as needed (Patient not taking: Reported on 2021 ), Disp: , Rfl: 0    Current Allergies     Allergies as of 2022 - Reviewed 2022   Allergen Reaction Noted    Tamsulosin  2018            The following portions of the patient's history were reviewed and updated as appropriate: allergies, current medications, past family history, past medical history, past social history, past surgical history and problem list      Past Medical History:   Diagnosis Date    COVID     Kidney stone     Urinary frequency        Past Surgical History:   Procedure Laterality Date    COLONOSCOPY      CYSTOSCOPY W/ STONE MANIPULATION         Family History   Problem Relation Age of Onset    Diabetes Father     Heart disease Father     Diabetes Brother          Medications have been verified  Objective   /84   Pulse 68   Temp 97 6 °F (36 4 °C)   Resp 18   Ht 5' 11" (1 803 m)   Wt 104 kg (230 lb)   SpO2 95%   BMI 32 08 kg/m²        Physical Exam     Physical Exam  Vitals and nursing note reviewed  Constitutional:       General: He is not in acute distress  Appearance: He is well-developed  Cardiovascular:      Rate and Rhythm: Normal rate and regular rhythm  Pulmonary:      Effort: Pulmonary effort is normal       Breath sounds: Normal breath sounds  Musculoskeletal:         General: Tenderness present  No deformity  Cervical back: Neck supple  Comments: Tender and swollen right hand dorsum and dorsal aspect right wrist, no snuffbox tenderness  Skin:     General: Skin is warm and dry  Findings: No erythema or rash  Neurological:      Mental Status: He is alert and oriented to person, place, and time  Deep Tendon Reflexes: Reflexes are normal and symmetric  Psychiatric:         Behavior: Behavior normal          Thought Content:  Thought content normal          Judgment: Judgment normal

## 2022-07-14 ENCOUNTER — OFFICE VISIT (OUTPATIENT)
Dept: OBGYN CLINIC | Facility: CLINIC | Age: 67
End: 2022-07-14
Payer: COMMERCIAL

## 2022-07-14 VITALS
WEIGHT: 235 LBS | DIASTOLIC BLOOD PRESSURE: 80 MMHG | BODY MASS INDEX: 31.83 KG/M2 | HEART RATE: 69 BPM | HEIGHT: 72 IN | SYSTOLIC BLOOD PRESSURE: 138 MMHG | TEMPERATURE: 98.2 F

## 2022-07-14 DIAGNOSIS — M54.50 CHRONIC BILATERAL LOW BACK PAIN WITHOUT SCIATICA: Primary | ICD-10-CM

## 2022-07-14 DIAGNOSIS — G89.29 CHRONIC BILATERAL LOW BACK PAIN WITHOUT SCIATICA: Primary | ICD-10-CM

## 2022-07-14 PROCEDURE — 99204 OFFICE O/P NEW MOD 45 MIN: CPT | Performed by: ORTHOPAEDIC SURGERY

## 2022-07-14 NOTE — PROGRESS NOTES
ASSESSMENT/PLAN:    Diagnoses and all orders for this visit:    Chronic bilateral low back pain without sciatica  -     Ambulatory Referral to Physical Therapy; Future    Other orders  -     Saw Palmetto, Serenoa repens, (SAW PALMETTO PO); Take by mouth        Plan:  I would recommend physical therapy and a prescription was provided  Will follow up with Dr Jones Parham in approximately 6 weeks  I have placed an order for x-rays of his lumbar spine to be obtained as an outpatient prior to his return for visit with Dr Jones Parham  He is to contact the office if questions or concerns arise  He did not want to utilize anti-inflammatory medications secondary to concern regarding liver or kidney injury  He may use Tylenol as needed  He was encouraged to contact the office if he felt more urgent re-evaluation was warranted  Return for  6 weeks with Dr Jones Parham       _____________________________________________________  CHIEF COMPLAINT:  Chief Complaint   Patient presents with    Lower Back - Pain         SUBJECTIVE:  Honey Alvarez is a 75y o  year old male who presents for evaluation of chronic back pain of approximately 1 year duration he denies any injury  He did not seek immediate medical care but about 6-8 weeks ago presented to a chiropractor and has been treated for several weeks  He has noted a minimal improvement in his pain  His chiropractor recently recommended evaluation by Orthopedic surgery and had suggested the potential need for physical therapy  He denies any lower extremity complaints  He denies bowel or bladder complaints  He complains of diffuse lower back pain  He notes chronic discomfort but pain is aggravated by activity  Other than the chiropractic care he has had no treatment and he denies having had any diagnostic studies        PAST MEDICAL HISTORY:  Past Medical History:   Diagnosis Date    COVID     Kidney stone     Urinary frequency        PAST SURGICAL HISTORY:  Past Surgical History:   Procedure Laterality Date    CARPAL TUNNEL RELEASE      COLONOSCOPY      CYSTOSCOPY W/ STONE MANIPULATION         FAMILY HISTORY:  Family History   Problem Relation Age of Onset    Diabetes Father     Heart disease Father     Diabetes Brother        SOCIAL HISTORY:  Social History     Tobacco Use    Smoking status: Never Smoker    Smokeless tobacco: Never Used   Vaping Use    Vaping Use: Never used   Substance Use Topics    Alcohol use: Yes     Alcohol/week: 2 0 - 4 0 standard drinks     Types: 2 - 4 Cans of beer per week    Drug use: No       MEDICATIONS:    Current Outpatient Medications:     Ascorbic Acid (vitamin C) 100 MG tablet, Take 100 mg by mouth daily, Disp: , Rfl:     cholecalciferol (VITAMIN D3) 1,000 units tablet, Take 1,000 Units by mouth daily, Disp: , Rfl:     Saw Palmetto, Serenoa repens, (SAW PALMETTO PO), Take by mouth, Disp: , Rfl:     Turmeric 500 MG CAPS, Take 500 mg by mouth in the morning, Disp: , Rfl:     Zinc Sulfate (ZINC 15 PO), Take by mouth, Disp: , Rfl:     ergocalciferol (VITAMIN D2) 50,000 units, TAKE 1 CAPSULE ORALLY ONCE PER WEEK (Patient not taking: No sig reported), Disp: , Rfl: 0    meloxicam (MOBIC) 15 mg tablet, Take 15 mg by mouth daily as needed (Patient not taking: No sig reported), Disp: , Rfl: 0    predniSONE 10 mg tablet, Take once daily all days pills on this schedule 6- 6- 5- 4- 3- 2- 1 (Patient not taking: Reported on 7/14/2022), Disp: 27 tablet, Rfl: 0    ALLERGIES:  Allergies   Allergen Reactions    Tamsulosin      Joint pain with alfuzosin also  Review of systems:   Constitutional: Negative for fatigue, fever or loss of apetite  HENT: Negative  Respiratory: Negative for shortness of breath, dyspnea  Cardiovascular: Negative for chest pain/tightness  Gastrointestinal: Negative for abdominal pain, N/V  Endocrine: Negative for cold/heat intolerance, unexplained weight loss/gain     Genitourinary: Negative for flank pain, dysuria, hematuria  Musculoskeletal:  Positive as in the HPI   Skin: Negative for rash  Neurological:  Negative  Psychiatric/Behavioral: Negative for agitation  _____________________________________________________  PHYSICAL EXAMINATION:    Blood pressure 138/80, pulse 69, temperature 98 2 °F (36 8 °C), temperature source Temporal, height 6' (1 829 m), weight 107 kg (235 lb)  General: well developed and well nourished, alert, oriented times 3 and appears comfortable  Psychiatric: Normal  HEENT: Benign  Cardiovascular: Regular    Pulmonary: No wheezing or stridor  Abdomen: Soft, Nontender  Skin: No masses, erythema, lacerations, fluctation, ulcerations  Neurovascular: Motor and sensory exams in both lower extremities are intact  Deep tendon reflexes were trace positive in both lower extremities  Pulses are palpable  MUSCULOSKELETAL EXAMINATION:    The lumbar spine exam demonstrates flexion reaching the distal 3rd of the tibia without complaints  He has 20° of extension without complaints  He has 60° of both right and left rotation without complaints  Side bending of only 20° was noted he does complain of discomfort  Straight leg raising elicited complaints of back but no radicular symptoms  Milvia Lessen test elicited back pain but no lower extremity or groin pain  He does have increased tone in his hamstrings with a popliteal angle of about 30° bilaterally  No clonus is elicited and Babinski's is downgoing  The lumbar spinous processes, paraspinal muscles and SI joints were nontender  Sciatic nerve in the buttocks and thighs were nontender          Chuyita Anger

## 2022-08-11 ENCOUNTER — HOSPITAL ENCOUNTER (OUTPATIENT)
Dept: RADIOLOGY | Facility: HOSPITAL | Age: 67
Discharge: HOME | End: 2022-08-11
Attending: ORTHOPAEDIC SURGERY
Payer: COMMERCIAL

## 2022-08-11 DIAGNOSIS — G89.29 CHRONIC BILATERAL LOW BACK PAIN WITHOUT SCIATICA: ICD-10-CM

## 2022-08-11 DIAGNOSIS — M54.50 CHRONIC BILATERAL LOW BACK PAIN WITHOUT SCIATICA: ICD-10-CM

## 2022-08-11 PROCEDURE — 72100 X-RAY EXAM L-S SPINE 2/3 VWS: CPT

## 2022-08-26 ENCOUNTER — CONSULT (OUTPATIENT)
Dept: PAIN MEDICINE | Facility: CLINIC | Age: 67
End: 2022-08-26
Payer: COMMERCIAL

## 2022-08-26 VITALS
TEMPERATURE: 97.9 F | SYSTOLIC BLOOD PRESSURE: 150 MMHG | DIASTOLIC BLOOD PRESSURE: 72 MMHG | HEIGHT: 72 IN | BODY MASS INDEX: 33.48 KG/M2 | WEIGHT: 247.2 LBS | RESPIRATION RATE: 20 BRPM | HEART RATE: 81 BPM

## 2022-08-26 DIAGNOSIS — M47.816 LUMBAR SPONDYLOSIS: Primary | ICD-10-CM

## 2022-08-26 DIAGNOSIS — G89.29 CHRONIC MIDLINE LOW BACK PAIN WITHOUT SCIATICA: ICD-10-CM

## 2022-08-26 DIAGNOSIS — M54.50 CHRONIC MIDLINE LOW BACK PAIN WITHOUT SCIATICA: ICD-10-CM

## 2022-08-26 PROCEDURE — 99204 OFFICE O/P NEW MOD 45 MIN: CPT | Performed by: ANESTHESIOLOGY

## 2022-08-26 RX ORDER — BUPIVACAINE HCL/PF 2.5 MG/ML
10 VIAL (ML) INJECTION ONCE
Status: CANCELLED | OUTPATIENT
Start: 2022-08-26 | End: 2022-08-26

## 2022-08-26 RX ORDER — METHYLPREDNISOLONE ACETATE 80 MG/ML
80 INJECTION, SUSPENSION INTRA-ARTICULAR; INTRALESIONAL; INTRAMUSCULAR; PARENTERAL; SOFT TISSUE ONCE
Status: CANCELLED | OUTPATIENT
Start: 2022-08-26 | End: 2022-08-26

## 2022-08-26 RX ORDER — LIDOCAINE HYDROCHLORIDE 10 MG/ML
10 INJECTION, SOLUTION EPIDURAL; INFILTRATION; INTRACAUDAL; PERINEURAL ONCE
Status: CANCELLED | OUTPATIENT
Start: 2022-08-26 | End: 2022-08-26

## 2022-08-26 RX ORDER — DOXYCYCLINE 100 MG/1
CAPSULE ORAL
COMMUNITY
Start: 2022-08-15 | End: 2022-09-26

## 2022-08-26 RX ORDER — MELOXICAM 7.5 MG/1
7.5 TABLET ORAL 2 TIMES DAILY PRN
Qty: 60 TABLET | Refills: 0 | Status: SHIPPED | OUTPATIENT
Start: 2022-08-26 | End: 2022-09-26

## 2022-08-26 NOTE — PATIENT INSTRUCTIONS
Core Strengthening Exercises   WHAT YOU NEED TO KNOW:   Your core includes the muscles of your lower back, hip, pelvis, and abdomen  Core strengthening exercises help heal and strengthen these muscles  This helps prevent another injury, and keeps your pelvis, spine, and hips in the correct position  DISCHARGE INSTRUCTIONS:   Contact your healthcare provider if:   You have sharp or worsening pain during exercise or at rest     You have questions or concerns about your shoulder exercises  Safety tips:  Talk to your healthcare provider before you start an exercise program  A physical therapist can teach you how to do core strengthening exercises safely  Do the exercises on a mat or firm surface  A firm surface will support your spine and prevent low back pain  Do not do these exercises on a bed  Move slowly and smoothly  Avoid fast or jerky motions  Stop if you feel pain  Core exercises should not be painful  Stop if you feel pain  Breathe normally during core exercises  Do not hold your breath  This may cause an increase in blood pressure and prevent muscle strengthening  Your healthcare provider will tell you when to inhale and exhale during the exercise  Begin all of your exercises with abdominal bracing  Abdominal bracing helps warm up your core muscles  You can also practice abdominal bracing throughout the day  Lie on your back with your knees bent and feet flat on the floor  Place your arms in a relaxed position beside your body  Tighten your abdominal muscles  Pull your belly button in and up toward your spine  Hold for 5 seconds  Relax your muscles  Repeat 10 times  Core strengthening exercises: Your healthcare provider will tell you how often to do these exercises  The provider will also tell you how many repetitions of each exercise you should do  Hold each exercise for 5 seconds or as directed  As you get stronger, increase your hold to 10 to 15 seconds   You can do some of these exercises on a stability ball, or with a weight  Ask your healthcare provider how to use a stability ball or weight for these exercises:  Bridging:  Lie on your back with your knees bent and feet flat on the floor  Rest your arms at your side  Tighten your buttocks, and then lift your hips 1 inch off the floor  Hold for 5 seconds  When you can do this exercise without pain for 10 seconds, increase the distance you lift your hips  A good goal is to be able to lift your hips so that your shoulders, hips, and knees are in a straight line  Dead bug:  Lie on your back with your knees bent and feet flat on the floor  Place your arms in a relaxed position beside your body  Begin with abdominal bracing  Next, raise one leg, keeping your knee bent  Hold for 5 seconds  Repeat with the other leg  When you can do this exercise without pain for 10 to 15 seconds, you may raise one straight leg and hold  Repeat with the other leg  Quadruped:  Place your hands and knees on the floor  Keep your wrists directly below your shoulders and your knees directly below your hips  Pull your belly button in toward your spine  Do not flatten or arch your back  Tighten your abdominal muscles below your belly button  Hold for 5 seconds  When you can do this exercise without pain for 10 to 15 seconds, you may extend one arm and hold  Repeat on the other side  Side bridge exercises:      Standing side bridge:  Stand next to a wall and extend one arm toward the wall  Place your palm flat on the wall with your fingers pointing upward  Begin with abdominal bracing  Next, without moving your feet, slowly bend your arm to 90 degrees  Hold for 5 seconds  Repeat on the other side  When you can do this exercise without pain for 10 to 15 seconds, you may do the bent leg side bridge on the floor  Bent leg side bridge:  Lie on one side with your legs, hips, and shoulders in a straight line   Prop yourself up onto your forearm so your elbow is directly below your shoulder  Bend your knees back to 90 degrees  Begin with abdominal bracing  Next, lift your hips and balance yourself on your forearm and knees  Hold for 5 seconds  Repeat on the other side  When you can do this exercise without pain for 10 to 15 seconds, you may do the straight leg side bridge on the floor  Straight leg side bridge:  Lie on one side with your legs, hips, and shoulders in a straight line  Prop yourself up onto your forearm so your elbow is directly below your shoulder  Begin with abdominal bracing  Lift your hips off the floor and balance yourself on your forearm and the outside of your flexed foot  Do not let your ankle bend sideways  Hold for 5 seconds  Repeat on the other side  When you can do this exercise without pain for 10 to 15 seconds, ask your healthcare provider for more advanced exercises  Superman:  Lie on your stomach  Extend your arms forward on the floor  Tighten your abdominal muscles and lift your right hand and left leg off the floor  Hold this position  Slowly return to the starting position  Tighten your abdominal muscles and lift your left hand and right leg off the floor  Hold this position  Slowly return to the starting position  Clam:  Lie on your side with your knees bent  Put your bottom arm under your head to keep your neck in line  Put your top hand on your hip to keep your pelvis from moving  Put your heels together, and keep them together during this exercise  Slowly raise your top knee toward the ceiling  Then lower your leg so your knees are together  Repeat this exercise 10 times  Then switch sides and do the exercise 10 times with the other leg  Curl up:  Lie on your back with your knees bent and feet flat on the floor  Place your hands, palms down, underneath your lower back  Next, with your elbows on the floor, lift your shoulders and chest 2 to 3 inches off the floor   Keep your head in line with your shoulders  Hold this position  Slowly return to the starting position  Straight leg raises:  Lie on your back with one leg straight  Bend the other knee and place your foot flat on the floor  Tighten your abdominal muscles  Keep your leg straight and slowly lift it straight up 6 to 12 inches off the floor  Hold this position  Lower your leg slowly  Do as many repetitions as directed on this side  Repeat with the other leg  Plank:  Lie on your stomach  Bend your elbows and place your forearms flat on the floor  Lift your chest, stomach, and knees off the floor  Make sure your elbows are below your shoulders  Your body should be in a straight line  Do not let your hips or lower back sink to the ground  Squeeze your abdominal muscles together and hold for 15 seconds  To make this exercise harder, hold for 30 seconds or lift 1 leg at a time  Bicycles:  Lie on your back  Bend both knees and bring them toward your chest  Your calves should be parallel to the floor  Place the palms of your hands on the back of your head  Straighten your right leg and keep it lifted 2 inches off the floor  Raise your head and shoulders off the floor and twist towards your left  Keep your head and shoulders lifted  Bend your right knee while you straighten your left leg  Keep your left leg 2 inches off the floor  Twist your head and chest towards the left leg  Continue to straighten 1 leg at a time and twist        Follow up with your doctor as directed:  Write down your questions so you remember to ask them during your visits  © Copyright [employer] 2022 Information is for End User's use only and may not be sold, redistributed or otherwise used for commercial purposes  All illustrations and images included in CareNotes® are the copyrighted property of [employer]  or [facility] Marlon Dooley   The above information is an  only   It is not intended as medical advice for individual conditions or treatments  Talk to your doctor, nurse or pharmacist before following any medical regimen to see if it is safe and effective for you

## 2022-08-26 NOTE — PROGRESS NOTES
Assessment  1  Lumbar spondylosis - Bilateral  -     MRI lumbar spine wo contrast; Future; Expected date: 08/26/2022  -     Case request operating room: BLOCK MEDIAL BRANCH L3, L4, L5 #1; Standing  -     Case request operating room: BLOCK MEDIAL BRANCH L3, L4, L5 #1  -     meloxicam (MOBIC) 7 5 mg tablet; Take 1 tablet (7 5 mg total) by mouth 2 (two) times a day as needed for moderate pain    2  Chronic midline low back pain without sciatica  -     MRI lumbar spine wo contrast; Future; Expected date: 08/26/2022  -     meloxicam (MOBIC) 7 5 mg tablet; Take 1 tablet (7 5 mg total) by mouth 2 (two) times a day as needed for moderate pain    Axial low back pain described primarily by arthritic features  Aching, nagging, indolent, stabbing, throbbing features in axial low back without radicular components  5/5 strength bilaterally, negative SLR  Positive facet loading maneuvers in lumbar spine elicited pain, positive tenderness to palpation over lumbar paraspinal muscles  Findings correlate with prominent lumbar facet arthropathy seen throughout axial low back on x-ray  Currently he is neurologically intact without gait instability, saddle anesthesia or bowel/bladder abnormality  Risks, benefits alternative to medial branch blocks and subsequent radiofrequency ablation of successful thoroughly discussed with patient  Handouts provided questions answered to patient satisfaction  Plan  -Bilateral L3, L4, L5 medial branch blocks #1; rtc 2 weeks post procedure (patient to call back to schedule)  -Meloxicam 7 5 mg b i d  prn pain prescribed  Patient educated regarding bleeding risk of taking this medication as well as not taking any other nonsteroidal anti-inflammatory medications while taking this medication; counseled thoroughly regarding potential risk of Cardiovascular injury, Kidney injury, Gastrointestinal ulceration/bleeding   Patient voiced understanding  -continue physical therapy for lumbar facet arthropathy; Physician directed home exercise plan as per [facility] demonstrated and handouts provided that patient plans to participate with for 1 hour, twice a week for the next 6 weeks  There are risks associated with opioid medications, including dependence, addiction and tolerance  The patient understands and agrees to use these medications only as prescribed  Potential side effects of the medications include, but are not limited to, constipation, drowsiness, addiction, impaired judgment and risk of fatal overdose if not taken as prescribed  The patient was warned against driving while taking sedation medications or operating heavy machinery  The patient voiced understanding  Sharing medications is a felony  At this point in time, the patient is showing no signs of addiction, abuse, diversion or suicidal ideation  [place] Prescription Drug Monitoring Program report was reviewed and was appropriate      Complete risks and benefits including bleeding, infection, tissue reaction, nerve injury and allergic reaction were discussed  The approach was demonstrated using models and literature was provided  Verbal and written consent was obtained  My impressions and treatment recommendations were discussed in detail with the patient who verbalized understanding and had no further questions  Discharge instructions were provided  I personally saw and examined the patient and I agree with the above discussed plan of care  My impressions and treatment recommendations were discussed in detail with the patient who verbalized understanding and had no further questions  Discharge instructions were provided  I personally saw and examined the patient and I agree with the above discussed plan of care      New Medications Ordered This Visit   Medications    Ascorbic Acid (VITAMIN C PO)     Si mg    doxycycline monohydrate (MONODOX) 100 mg capsule     Sig: TAKE 1 CAPSULE BY MOUTH TWICE A DAY FOR 21 DAYS History of Present Illness    Jeni Toussaint is a 75 y o  male  With pmhx of lyme disease, urinary stones/frequency presenting with a past medical history of chronic low back pain described primarily as arthritic in nature  He describes 8/10 low back pain that is worse in the mornings and worse at the end of the day  The pain is characterized by achy, nagging, indolent, crampy, stabbing pain in his axial low back  The patient describes that the pain is worse with standing for long periods of time on hard surfaces as well as with walking  The patient is a very active individual and feels as though this pain compromises his participation with independent activities of daily living  The pain can be debilitating at times and contribute to significant disability, compromising overall activity and independent activities of daily living  He has tried physical therapy with good relief of symptoms  Medications the patient has tried in the past include nsaids  He describes no radicular symptoms and has good strength  He denies any weakness numbness or paresthesias  The patient denies any saddle anesthesia, gait abnormality or incontinence  I have personally reviewed and/or updated the patient's past medical history, past surgical history, family history, social history, current medications, allergies, and vital signs today  Review of Systems   Constitutional: Positive for activity change  HENT: Negative  Eyes: Negative  Respiratory: Negative  Cardiovascular: Negative  Gastrointestinal: Negative  Endocrine: Negative  Genitourinary: Negative  Musculoskeletal: Positive for arthralgias, back pain, gait problem and myalgias  Skin: Negative  Allergic/Immunologic: Negative  Neurological: Negative for weakness and numbness  Hematological: Negative  Psychiatric/Behavioral: Negative  All other systems reviewed and are negative        Patient Active Problem List   Diagnosis    Calculus of kidney    Benign prostatic hyperplasia without lower urinary tract symptoms    Elevated PSA    Chronic bilateral low back pain without sciatica    Lumbar spondylosis - Bilateral       Past Medical History:   Diagnosis Date    COVID     Kidney stone     Lyme disease     Urinary frequency        Past Surgical History:   Procedure Laterality Date    CARPAL TUNNEL RELEASE      COLONOSCOPY      CYSTOSCOPY W/ STONE MANIPULATION         Family History   Problem Relation Age of Onset    Diabetes Father     Heart disease Father     Diabetes Brother        Social History     Occupational History    Occupation: Mechanical Tech  Tobacco Use    Smoking status: Never Smoker    Smokeless tobacco: Never Used   Vaping Use    Vaping Use: Never used   Substance and Sexual Activity    Alcohol use:  Yes     Alcohol/week: 2 0 - 4 0 standard drinks     Types: 2 - 4 Cans of beer per week    Drug use: No    Sexual activity: Not on file       Current Outpatient Medications on File Prior to Visit   Medication Sig    Ascorbic Acid (VITAMIN C PO) 500 mg    cholecalciferol (VITAMIN D3) 1,000 units tablet Take 1,000 Units by mouth daily    doxycycline monohydrate (MONODOX) 100 mg capsule TAKE 1 CAPSULE BY MOUTH TWICE A DAY FOR 21 DAYS    ergocalciferol (VITAMIN D2) 50,000 units TAKE 1 CAPSULE ORALLY ONCE PER WEEK    Saw Palmetto, Serenoa repens, (SAW PALMETTO PO) Take by mouth    Turmeric 500 MG CAPS Take 500 mg by mouth in the morning    Zinc Sulfate (ZINC 15 PO) Take by mouth    [DISCONTINUED] Ascorbic Acid (vitamin C) 100 MG tablet Take 100 mg by mouth daily    [DISCONTINUED] meloxicam (MOBIC) 15 mg tablet Take 15 mg by mouth daily as needed (Patient not taking: No sig reported)    [DISCONTINUED] predniSONE 10 mg tablet Take once daily all days pills on this schedule 6- 6- 5- 4- 3- 2- 1 (Patient not taking: Reported on 7/14/2022)     No current facility-administered medications on file prior to visit  Allergies   Allergen Reactions    Tamsulosin      Joint pain with alfuzosin also  Physical Exam    /72   Pulse 81   Temp 97 9 °F (36 6 °C)   Resp 20   Ht 5' 11 5" (1 816 m)   Wt 112 kg (247 lb 3 2 oz)   BMI 34 00 kg/m²     Constitutional: normal, well developed, well nourished, alert, in no distress and non-toxic and no overt pain behavior  and obese  Eyes: anicteric  HEENT: grossly intact  Neck: supple, symmetric, trachea midline and no masses   Pulmonary:even and unlabored  Cardiovascular:No edema or pitting edema present  Skin:Normal without rashes or lesions and well hydrated  Psychiatric:Mood and affect appropriate  Neurologic:Cranial Nerves II-XII grossly intact Sensation grossly intact; no clonus negative barry's  Reflexes 2+ and brisk  SLR negative bilaterally  Spurling's maneuver negative bilaterally  Musculoskeletal:normal gait  5/5 strength bilaterally with AROM in all extremities  Normal heel toe and tip toe walking  Significant pain with lumbar facet loading bilaterally and with lateral spine rotation  ttp over lumbar paraspinal muscles  Negative edis's test, negative gaenslen's negative SIJ loading bilaterally  Imaging    LUMBAR SPINE     INDICATION:   M54 50: Low back pain, unspecified  G89 29: Other chronic pain      COMPARISON:  None     VIEWS:  [facility] SPINE LUMBAR 2 OR 3 VIEWS INJURY        FINDINGS:     There are 5 non rib bearing lumbar vertebral bodies       There is no evidence of acute fracture or destructive osseous lesion  Note is made of rudimentary ribs at L1      Alignment is unremarkable       Facet joint degenerative arthropathy at L4-5 and L5-S1  Mild multilevel degenerative disc disease with endplate sclerosis and anterior osteophytes      The pedicles appear intact      Soft tissues are unremarkable      IMPRESSION:     No acute osseous abnormality        Mild multilevel degenerative changes

## 2022-08-26 NOTE — H&P (VIEW-ONLY)
Assessment  1  Lumbar spondylosis - Bilateral  -     MRI lumbar spine wo contrast; Future; Expected date: 08/26/2022  -     Case request operating room: BLOCK MEDIAL BRANCH L3, L4, L5 #1; Standing  -     Case request operating room: BLOCK MEDIAL BRANCH L3, L4, L5 #1  -     meloxicam (MOBIC) 7 5 mg tablet; Take 1 tablet (7 5 mg total) by mouth 2 (two) times a day as needed for moderate pain    2  Chronic midline low back pain without sciatica  -     MRI lumbar spine wo contrast; Future; Expected date: 08/26/2022  -     meloxicam (MOBIC) 7 5 mg tablet; Take 1 tablet (7 5 mg total) by mouth 2 (two) times a day as needed for moderate pain    Axial low back pain described primarily by arthritic features  Aching, nagging, indolent, stabbing, throbbing features in axial low back without radicular components  5/5 strength bilaterally, negative SLR  Positive facet loading maneuvers in lumbar spine elicited pain, positive tenderness to palpation over lumbar paraspinal muscles  Findings correlate with prominent lumbar facet arthropathy seen throughout axial low back on x-ray  Currently he is neurologically intact without gait instability, saddle anesthesia or bowel/bladder abnormality  Risks, benefits alternative to medial branch blocks and subsequent radiofrequency ablation of successful thoroughly discussed with patient  Handouts provided questions answered to patient satisfaction  Plan  -Bilateral L3, L4, L5 medial branch blocks #1; rtc 2 weeks post procedure (patient to call back to schedule)  -Meloxicam 7 5 mg b i d  prn pain prescribed  Patient educated regarding bleeding risk of taking this medication as well as not taking any other nonsteroidal anti-inflammatory medications while taking this medication; counseled thoroughly regarding potential risk of Cardiovascular injury, Kidney injury, Gastrointestinal ulceration/bleeding   Patient voiced understanding  -continue physical therapy for lumbar facet arthropathy; Physician directed home exercise plan as per [facility] demonstrated and handouts provided that patient plans to participate with for 1 hour, twice a week for the next 6 weeks  There are risks associated with opioid medications, including dependence, addiction and tolerance  The patient understands and agrees to use these medications only as prescribed  Potential side effects of the medications include, but are not limited to, constipation, drowsiness, addiction, impaired judgment and risk of fatal overdose if not taken as prescribed  The patient was warned against driving while taking sedation medications or operating heavy machinery  The patient voiced understanding  Sharing medications is a felony  At this point in time, the patient is showing no signs of addiction, abuse, diversion or suicidal ideation  [place] Prescription Drug Monitoring Program report was reviewed and was appropriate      Complete risks and benefits including bleeding, infection, tissue reaction, nerve injury and allergic reaction were discussed  The approach was demonstrated using models and literature was provided  Verbal and written consent was obtained  My impressions and treatment recommendations were discussed in detail with the patient who verbalized understanding and had no further questions  Discharge instructions were provided  I personally saw and examined the patient and I agree with the above discussed plan of care  My impressions and treatment recommendations were discussed in detail with the patient who verbalized understanding and had no further questions  Discharge instructions were provided  I personally saw and examined the patient and I agree with the above discussed plan of care      New Medications Ordered This Visit   Medications    Ascorbic Acid (VITAMIN C PO)     Si mg    doxycycline monohydrate (MONODOX) 100 mg capsule     Sig: TAKE 1 CAPSULE BY MOUTH TWICE A DAY FOR 21 DAYS History of Present Illness    Rosie Pastrana is a 75 y o  male  With pmhx of lyme disease, urinary stones/frequency presenting with a past medical history of chronic low back pain described primarily as arthritic in nature  He describes 8/10 low back pain that is worse in the mornings and worse at the end of the day  The pain is characterized by achy, nagging, indolent, crampy, stabbing pain in his axial low back  The patient describes that the pain is worse with standing for long periods of time on hard surfaces as well as with walking  The patient is a very active individual and feels as though this pain compromises his participation with independent activities of daily living  The pain can be debilitating at times and contribute to significant disability, compromising overall activity and independent activities of daily living  He has tried physical therapy with good relief of symptoms  Medications the patient has tried in the past include nsaids  He describes no radicular symptoms and has good strength  He denies any weakness numbness or paresthesias  The patient denies any saddle anesthesia, gait abnormality or incontinence  I have personally reviewed and/or updated the patient's past medical history, past surgical history, family history, social history, current medications, allergies, and vital signs today  Review of Systems   Constitutional: Positive for activity change  HENT: Negative  Eyes: Negative  Respiratory: Negative  Cardiovascular: Negative  Gastrointestinal: Negative  Endocrine: Negative  Genitourinary: Negative  Musculoskeletal: Positive for arthralgias, back pain, gait problem and myalgias  Skin: Negative  Allergic/Immunologic: Negative  Neurological: Negative for weakness and numbness  Hematological: Negative  Psychiatric/Behavioral: Negative  All other systems reviewed and are negative        Patient Active Problem List   Diagnosis    Calculus of kidney    Benign prostatic hyperplasia without lower urinary tract symptoms    Elevated PSA    Chronic bilateral low back pain without sciatica    Lumbar spondylosis - Bilateral       Past Medical History:   Diagnosis Date    COVID     Kidney stone     Lyme disease     Urinary frequency        Past Surgical History:   Procedure Laterality Date    CARPAL TUNNEL RELEASE      COLONOSCOPY      CYSTOSCOPY W/ STONE MANIPULATION         Family History   Problem Relation Age of Onset    Diabetes Father     Heart disease Father     Diabetes Brother        Social History     Occupational History    Occupation: Mechanical Tech  Tobacco Use    Smoking status: Never Smoker    Smokeless tobacco: Never Used   Vaping Use    Vaping Use: Never used   Substance and Sexual Activity    Alcohol use:  Yes     Alcohol/week: 2 0 - 4 0 standard drinks     Types: 2 - 4 Cans of beer per week    Drug use: No    Sexual activity: Not on file       Current Outpatient Medications on File Prior to Visit   Medication Sig    Ascorbic Acid (VITAMIN C PO) 500 mg    cholecalciferol (VITAMIN D3) 1,000 units tablet Take 1,000 Units by mouth daily    doxycycline monohydrate (MONODOX) 100 mg capsule TAKE 1 CAPSULE BY MOUTH TWICE A DAY FOR 21 DAYS    ergocalciferol (VITAMIN D2) 50,000 units TAKE 1 CAPSULE ORALLY ONCE PER WEEK    Saw Palmetto, Serenoa repens, (SAW PALMETTO PO) Take by mouth    Turmeric 500 MG CAPS Take 500 mg by mouth in the morning    Zinc Sulfate (ZINC 15 PO) Take by mouth    [DISCONTINUED] Ascorbic Acid (vitamin C) 100 MG tablet Take 100 mg by mouth daily    [DISCONTINUED] meloxicam (MOBIC) 15 mg tablet Take 15 mg by mouth daily as needed (Patient not taking: No sig reported)    [DISCONTINUED] predniSONE 10 mg tablet Take once daily all days pills on this schedule 6- 6- 5- 4- 3- 2- 1 (Patient not taking: Reported on 7/14/2022)     No current facility-administered medications on file prior to visit  Allergies   Allergen Reactions    Tamsulosin      Joint pain with alfuzosin also  Physical Exam    /72   Pulse 81   Temp 97 9 °F (36 6 °C)   Resp 20   Ht 5' 11 5" (1 816 m)   Wt 112 kg (247 lb 3 2 oz)   BMI 34 00 kg/m²     Constitutional: normal, well developed, well nourished, alert, in no distress and non-toxic and no overt pain behavior  and obese  Eyes: anicteric  HEENT: grossly intact  Neck: supple, symmetric, trachea midline and no masses   Pulmonary:even and unlabored  Cardiovascular:No edema or pitting edema present  Skin:Normal without rashes or lesions and well hydrated  Psychiatric:Mood and affect appropriate  Neurologic:Cranial Nerves II-XII grossly intact Sensation grossly intact; no clonus negative barry's  Reflexes 2+ and brisk  SLR negative bilaterally  Spurling's maneuver negative bilaterally  Musculoskeletal:normal gait  5/5 strength bilaterally with AROM in all extremities  Normal heel toe and tip toe walking  Significant pain with lumbar facet loading bilaterally and with lateral spine rotation  ttp over lumbar paraspinal muscles  Negative edis's test, negative gaenslen's negative SIJ loading bilaterally  Imaging    LUMBAR SPINE     INDICATION:   M54 50: Low back pain, unspecified  G89 29: Other chronic pain      COMPARISON:  None     VIEWS:  [facility] SPINE LUMBAR 2 OR 3 VIEWS INJURY        FINDINGS:     There are 5 non rib bearing lumbar vertebral bodies       There is no evidence of acute fracture or destructive osseous lesion  Note is made of rudimentary ribs at L1      Alignment is unremarkable       Facet joint degenerative arthropathy at L4-5 and L5-S1  Mild multilevel degenerative disc disease with endplate sclerosis and anterior osteophytes      The pedicles appear intact      Soft tissues are unremarkable      IMPRESSION:     No acute osseous abnormality        Mild multilevel degenerative changes

## 2022-09-06 ENCOUNTER — HOSPITAL ENCOUNTER (OUTPATIENT)
Dept: MRI IMAGING | Facility: HOSPITAL | Age: 67
Discharge: HOME | End: 2022-09-06
Attending: ANESTHESIOLOGY
Payer: COMMERCIAL

## 2022-09-06 DIAGNOSIS — M54.50 CHRONIC MIDLINE LOW BACK PAIN WITHOUT SCIATICA: ICD-10-CM

## 2022-09-06 DIAGNOSIS — M47.816 LUMBAR SPONDYLOSIS: ICD-10-CM

## 2022-09-06 DIAGNOSIS — G89.29 CHRONIC MIDLINE LOW BACK PAIN WITHOUT SCIATICA: ICD-10-CM

## 2022-09-06 PROCEDURE — 72148 MRI LUMBAR SPINE W/O DYE: CPT

## 2022-09-06 PROCEDURE — G1004 CDSM NDSC: HCPCS

## 2022-09-08 ENCOUNTER — PREP FOR PROCEDURE (OUTPATIENT)
Dept: PAIN MEDICINE | Facility: CLINIC | Age: 67
End: 2022-09-08

## 2022-09-08 ENCOUNTER — TELEPHONE (OUTPATIENT)
Dept: PAIN MEDICINE | Facility: CLINIC | Age: 67
End: 2022-09-08

## 2022-09-08 DIAGNOSIS — M47.816 LUMBAR SPONDYLOSIS: Primary | ICD-10-CM

## 2022-09-08 NOTE — TELEPHONE ENCOUNTER
----- Message from Bhavya Marcus MD sent at 9/8/2022 11:54 AM EDT -----  Please schedule patient for bilateral L3, L4, L5 medial branch blocks #1, thanks

## 2022-09-08 NOTE — TELEPHONE ENCOUNTER
S/w pt  Scheduled L3-L5 MBB #1 9/15/2022  Pt aware of instructions  No food/drink one hour prior  Wear comfortable clothing  A  is required  Hold pain medication 6 hr prior and after procedure  Continue medications  Call if prescribed an antibiotic or become ill  Refrain from vaccinations two weeks prior and after  Call with questions

## 2022-09-15 ENCOUNTER — APPOINTMENT (OUTPATIENT)
Dept: RADIOLOGY | Facility: HOSPITAL | Age: 67
End: 2022-09-15
Payer: COMMERCIAL

## 2022-09-15 ENCOUNTER — HOSPITAL ENCOUNTER (OUTPATIENT)
Facility: HOSPITAL | Age: 67
Setting detail: OUTPATIENT SURGERY
Discharge: HOME | End: 2022-09-15
Attending: ANESTHESIOLOGY | Admitting: ANESTHESIOLOGY
Payer: COMMERCIAL

## 2022-09-15 VITALS
OXYGEN SATURATION: 96 % | BODY MASS INDEX: 33.46 KG/M2 | HEART RATE: 68 BPM | DIASTOLIC BLOOD PRESSURE: 86 MMHG | RESPIRATION RATE: 18 BRPM | WEIGHT: 247 LBS | HEIGHT: 72 IN | TEMPERATURE: 98.1 F | SYSTOLIC BLOOD PRESSURE: 172 MMHG

## 2022-09-15 PROCEDURE — 64494 INJ PARAVERT F JNT L/S 2 LEV: CPT | Performed by: ANESTHESIOLOGY

## 2022-09-15 PROCEDURE — 77002 NEEDLE LOCALIZATION BY XRAY: CPT

## 2022-09-15 PROCEDURE — 64493 INJ PARAVERT F JNT L/S 1 LEV: CPT | Performed by: ANESTHESIOLOGY

## 2022-09-15 RX ORDER — ALPRAZOLAM 0.5 MG/1
0.5 TABLET ORAL ONCE
Status: COMPLETED | OUTPATIENT
Start: 2022-09-15 | End: 2022-09-15

## 2022-09-15 RX ORDER — LIDOCAINE HYDROCHLORIDE 10 MG/ML
INJECTION, SOLUTION EPIDURAL; INFILTRATION; INTRACAUDAL; PERINEURAL AS NEEDED
Status: DISCONTINUED | OUTPATIENT
Start: 2022-09-15 | End: 2022-09-15 | Stop reason: HOSPADM

## 2022-09-15 RX ORDER — METHYLPREDNISOLONE ACETATE 80 MG/ML
INJECTION, SUSPENSION INTRA-ARTICULAR; INTRALESIONAL; INTRAMUSCULAR; SOFT TISSUE AS NEEDED
Status: DISCONTINUED | OUTPATIENT
Start: 2022-09-15 | End: 2022-09-15 | Stop reason: HOSPADM

## 2022-09-15 RX ORDER — BUPIVACAINE HYDROCHLORIDE 2.5 MG/ML
INJECTION, SOLUTION EPIDURAL; INFILTRATION; INTRACAUDAL AS NEEDED
Status: DISCONTINUED | OUTPATIENT
Start: 2022-09-15 | End: 2022-09-15 | Stop reason: HOSPADM

## 2022-09-15 RX ADMIN — ALPRAZOLAM 0.5 MG: 0.5 TABLET ORAL at 12:44

## 2022-09-15 NOTE — DISCHARGE INSTRUCTIONS
YOUR 2 WEEK FOLLOW UP HAS BEEN SCHEDULED; IF YOU WISH TO CHANGE THE FOLLOW UP, PLEASE CALL THE [facility] [place]: [phone]    MEDIAL BRANCH BLOCK DISCHARGE INSTRUCTIONS  ACTIVITY  Please do activities that will bring the normal pain that we are rating  For example, if vacuuming or walking increases the painm do that  Gilberto twill give the most accurate response to the diary  You may shower, but no tub baths today, or applied heat  CARE OF THE INJECTION SITE  This area may be numb for several hours after the injection  Notify the [facility] if you have any of the following: redness, drainage, swelling or fever above 100°F     SPECIAL INSTRUCTIONS  Please return the MBB diary to our office by mail, fax, or drop it off  MEDICATIONS  Please do not take any break through or short acting pain medications for 8 hours after the block  Continue to take all routine medications  Our office may have instructed you to hold some medications  You may resume _______________________________________________  If you have any problems specifically related to your procedure, please call our office at [phone]  Problems not related to your procedure should be directed at your primary care physician  Lumbar Radiofrequency Ablation   WHAT YOU NEED TO KNOW:   What do I need to know about lumbar radiofrequency ablation? Lumbar radiofrequency ablation (RFA) is a procedure used to treat facet joint pain in your lower back  Facet joints are found at the back of each vertebra  A needle electrode is used to send electrical currents to the nerves in your facet joint  The electrical currents create heat that damages the nerve so it cannot send pain signals  How do I prepare for lumbar RFA? Your healthcare provider will talk to you about how to prepare for this procedure  He may tell you not to eat or drink anything after midnight on the day of your procedure   He will tell you what medicines to take or not take on the day of your procedure  What will happen during lumbar RFA? You will lie on your stomach  You will be given local anesthesia to numb the area of your back where the needle electrode will be inserted  You may be given a sedative to help keep you relaxed  You may still feel pressure or pushing during the procedure, but you should not feel any pain  Your healthcare provider will use fluoroscopy (a type of x-ray) to guide the needle electrode to the nerves near your facet joint  Your healthcare provider may touch the affected nerve to make sure the needle electrode is in the right place  You will feel tingling or pressure when he does this  He will then apply local anesthesia to the nerve to numb it  This will prevent you from feeling pain when he applies heat to the nerve  Your healthcare provider will then apply heat to the nerve using the needle electrode  He may need to apply heat to more than one nerve  He will remove the needle electrode and apply a bandage over the area  What are the risks of lumbar RFA? You may have pain, numbness, tingling, or burning in the area where the lumbar RFA was done  These normally go away within 6 weeks  The needle electrode may injure your spinal nerves  This may cause permanent leg weakness or nerve pain  CARE AGREEMENT:   You have the right to help plan your care  Learn about your health condition and how it may be treated  Discuss treatment options with your healthcare providers to decide what care you want to receive  You always have the right to refuse treatment  The above information is an  only  It is not intended as medical advice for individual conditions or treatments  Talk to your doctor, nurse or pharmacist before following any medical regimen to see if it is safe and effective for you    © Copyright [employer] 2022 Information is for End User's use only and may not be sold, redistributed or otherwise used for commercial purposes   All illustrations and images included in CareNotes® are the copyrighted property of [employer]  or [facility] Marlon Pedroza

## 2022-09-15 NOTE — INTERVAL H&P NOTE
H&P reviewed  After examining the patient discussed elevated BP and all cause mortality, risk of stroke, MI with steroid in epidural injection administration; will work with pcp to lower in coming weeks        Vitals:    09/15/22 1236   BP: (!) 176/91   Pulse: 70   Resp: 18   Temp: 98 1 °F (36 7 °C)

## 2022-09-15 NOTE — OP NOTE
OPERATIVE REPORT  PATIENT NAME: Mj Bullock    :  1955  MRN: [id number]  Pt Location:  GI ROOM 01    SURGERY DATE: 9/15/2022    Surgeon(s) and Role: Kate Katz MD - Primary    Preop Diagnosis:  Lumbar spondylosis [M47 816]    Post-Op Diagnosis Codes:     * Lumbar spondylosis [M47 816]    Procedure(s) (LRB):  BLOCK MEDIAL BRANCH L3, L4, L5 (Bilateral)    Specimen(s):  * No specimens in log *    Estimated Blood Loss:   Minimal    Drains:  * No LDAs found *    Anesthesia Type:   Local    Operative Indications:  Lumbar spondylosis [M47 816]    Operative Findings:  Bilateral L3, L4, L5 medial branch nerve regions identified under fluoroscopic guidance      Complications:   None    Procedure and Technique:  Please see detailed procedure note     I was present for the entire procedure    Patient Disposition:  PACU     SIGNATURE: Tiera Claros MD  DATE: September 15, 2022  TIME: 1:09 PM

## 2022-09-15 NOTE — PROCEDURES
Pre-procedure Diagnosis: Lumbar Facet Arthropathy  Post-procedure Diagnosis: Lumbar Facet Arthropathy  Procedure Title(s):  [BILATERAL L3, L4, L5] medial branch nerve blocks [(DIAGNOSTIC)]  Attending Surgeon:   Meenakshi Paniagua MD  Anesthesia:   Local     Indications: The patient is a 75y o  year-old male with a diagnosis of lumbar facet arthropathy  The patient's history and physical exam were reviewed  The risks, benefits and alternatives to the procedure were discussed, and all questions were answered to the patient's satisfaction  The patient agreed to proceed, and written informed consent was obtained  Procedure in Detail: The patient was brought into the procedure room and placed in the prone position on the fluoroscopy table  The area of the lumbar spine was prepped with chloraprep and then draped in a sterile manner  AP fluoroscopy was used to identify the [L3-L5] levels on the [LEFT] side  The C-arm was obliqued to visualize the junction of the superior articulate process and transverse process  The sacral ala was identified and marked  The skin in these identified areas was anesthetized with 1% lidocaine  A 22-gauge, 3½-inch spinal needle was advanced toward each of these points under fluoroscopic guidance  Once bone was contacted, negative aspiration was confirmed and [1-mL] of a [6mL]mixture of [5mL] [0 25% bupivicaine] and 1mL of 80mg/mL Depomedrol was injected at each level  (The same procedure was performed on the RIGHT side )    After the procedure was completed, the patient's back was cleaned and bandages were placed at the needle insertion sites  Disposition: The patient tolerated the procedure well, and there were no apparent complications  The patient was taken to the recovery area where written discharge instructions for the procedure were given  The patient was given a pain diary to determine if the patient's pain improves following the injection   Once the diary is returned we will consider next appropriate course of treatment      Postoperative pain relief [WAS] significant    Estimated Blood Loss: None  Specimens Obtained: N/A

## 2022-09-26 ENCOUNTER — OFFICE VISIT (OUTPATIENT)
Dept: PAIN MEDICINE | Facility: CLINIC | Age: 67
End: 2022-09-26
Payer: COMMERCIAL

## 2022-09-26 VITALS
RESPIRATION RATE: 20 BRPM | HEIGHT: 72 IN | TEMPERATURE: 97.6 F | HEART RATE: 67 BPM | SYSTOLIC BLOOD PRESSURE: 130 MMHG | BODY MASS INDEX: 32.43 KG/M2 | WEIGHT: 239.4 LBS | DIASTOLIC BLOOD PRESSURE: 74 MMHG

## 2022-09-26 DIAGNOSIS — M47.816 LUMBAR SPONDYLOSIS: Primary | ICD-10-CM

## 2022-09-26 PROCEDURE — 99214 OFFICE O/P EST MOD 30 MIN: CPT | Performed by: ANESTHESIOLOGY

## 2022-09-26 RX ORDER — LIDOCAINE HYDROCHLORIDE 10 MG/ML
10 INJECTION, SOLUTION EPIDURAL; INFILTRATION; INTRACAUDAL; PERINEURAL ONCE
Status: CANCELLED | OUTPATIENT
Start: 2022-09-26 | End: 2022-09-26

## 2022-09-26 RX ORDER — METHYLPREDNISOLONE ACETATE 80 MG/ML
80 INJECTION, SUSPENSION INTRA-ARTICULAR; INTRALESIONAL; INTRAMUSCULAR; PARENTERAL; SOFT TISSUE ONCE
Status: CANCELLED | OUTPATIENT
Start: 2022-09-26 | End: 2022-09-26

## 2022-09-26 RX ORDER — BUPIVACAINE HCL/PF 2.5 MG/ML
10 VIAL (ML) INJECTION ONCE
Status: CANCELLED | OUTPATIENT
Start: 2022-09-26 | End: 2022-09-26

## 2022-09-26 RX ORDER — HYDROCHLOROTHIAZIDE 25 MG/1
25 TABLET ORAL
COMMUNITY
Start: 2022-09-16

## 2022-09-26 NOTE — H&P (VIEW-ONLY)
Assessment  1  Lumbar spondylosis - Bilateral    Greater than 80% relief of pain with improved ability to participate with IADLs after bilateral L3, L4, L5 medial branch blocks #1   Previously reported the following symptomatology:     Axial low back pain described primarily by arthritic features  Aching, nagging, indolent, stabbing, throbbing features in axial low back without radicular components  5/5 strength bilaterally, negative SLR  Positive facet loading maneuvers in lumbar spine elicited pain, positive tenderness to palpation over lumbar paraspinal muscles  Findings correlate with prominent lumbar facet arthropathy seen throughout axial low back on x-ray  Currently he is neurologically intact without gait instability, saddle anesthesia or bowel/bladder abnormality  Risks, benefits alternative to medial branch blocks and subsequent radiofrequency ablation of successful thoroughly discussed with patient  Handouts provided questions answered to patient satisfaction  Plan  -Bilateral L3, L4, L5 medial branch blocks #2; rtc 2 weeks post procedure (patient to call back to schedule)  -Meloxicam 7 5 mg b i d  prn pain prescribed  Educated to wean given elevated BP and feet swelling  Patient educated regarding bleeding risk of taking this medication as well as not taking any other nonsteroidal anti-inflammatory medications while taking this medication; counseled thoroughly regarding potential risk of Cardiovascular injury, Kidney injury, Gastrointestinal ulceration/bleeding  Patient voiced understanding  -continue physical therapy for lumbar facet arthropathy; Physician directed home exercise plan as per [facility] demonstrated and handouts provided that patient plans to participate with for 1 hour, twice a week for the next 6 weeks  There are risks associated with opioid medications, including dependence, addiction and tolerance   The patient understands and agrees to use these medications only as prescribed  Potential side effects of the medications include, but are not limited to, constipation, drowsiness, addiction, impaired judgment and risk of fatal overdose if not taken as prescribed  The patient was warned against driving while taking sedation medications or operating heavy machinery  The patient voiced understanding  Sharing medications is a felony  At this point in time, the patient is showing no signs of addiction, abuse, diversion or suicidal ideation  [place] Prescription Drug Monitoring Program report was reviewed and was appropriate      Complete risks and benefits including bleeding, infection, tissue reaction, nerve injury and allergic reaction were discussed  The approach was demonstrated using models and literature was provided  Verbal and written consent was obtained  My impressions and treatment recommendations were discussed in detail with the patient who verbalized understanding and had no further questions  Discharge instructions were provided  I personally saw and examined the patient and I agree with the above discussed plan of care  My impressions and treatment recommendations were discussed in detail with the patient who verbalized understanding and had no further questions  Discharge instructions were provided  I personally saw and examined the patient and I agree with the above discussed plan of care  New Medications Ordered This Visit   Medications    hydrochlorothiazide (HYDRODIURIL) 25 mg tablet     Si mg       History of Present Illness    Greater than 80% relief of pain with improved ability to participate with IADLs after bilateral L3, L4, L5 medial branch blocks #1   Previously reported the following symptomatology:     Bogdan Palmer is a 75 y o  male  With pmhx of lyme disease, urinary stones/frequency presenting with a past medical history of chronic low back pain described primarily as arthritic in nature    He describes 8/10 low back pain that is worse in the mornings and worse at the end of the day  The pain is characterized by achy, nagging, indolent, crampy, stabbing pain in his axial low back  The patient describes that the pain is worse with standing for long periods of time on hard surfaces as well as with walking  The patient is a very active individual and feels as though this pain compromises his participation with independent activities of daily living  The pain can be debilitating at times and contribute to significant disability, compromising overall activity and independent activities of daily living  He has tried physical therapy with good relief of symptoms  Medications the patient has tried in the past include nsaids  He describes no radicular symptoms and has good strength  He denies any weakness numbness or paresthesias  The patient denies any saddle anesthesia, gait abnormality or incontinence  I have personally reviewed and/or updated the patient's past medical history, past surgical history, family history, social history, current medications, allergies, and vital signs today  Review of Systems   Constitutional: Positive for activity change  HENT: Negative  Eyes: Negative  Respiratory: Negative  Cardiovascular: Negative  Gastrointestinal: Negative  Endocrine: Negative  Genitourinary: Negative  Musculoskeletal: Positive for arthralgias, back pain, gait problem and myalgias  Skin: Negative  Allergic/Immunologic: Negative  Neurological: Negative for weakness and numbness  Hematological: Negative  Psychiatric/Behavioral: Negative  All other systems reviewed and are negative        Patient Active Problem List   Diagnosis    Calculus of kidney    Benign prostatic hyperplasia without lower urinary tract symptoms    Elevated PSA    Chronic bilateral low back pain without sciatica    Lumbar spondylosis - Bilateral       Past Medical History:   Diagnosis Date    COVID     Kidney stone     Lyme disease     Urinary frequency        Past Surgical History:   Procedure Laterality Date    CARPAL TUNNEL RELEASE      COLONOSCOPY      CYSTOSCOPY W/ STONE MANIPULATION      FL GUIDED NEEDLE PLAC BX/ASP/INJ  9/15/2022    NERVE BLOCK Bilateral 9/15/2022    Procedure: BLOCK MEDIAL BRANCH L3, L4, L5;  Surgeon: Kerri Lucas MD;  Location: [facility];  Service: Pain Management        Family History   Problem Relation Age of Onset    Diabetes Father     Heart disease Father     Diabetes Brother        Social History     Occupational History    Occupation:   Tobacco Use    Smoking status: Never Smoker    Smokeless tobacco: Never Used   Vaping Use    Vaping Use: Never used   Substance and Sexual Activity    Alcohol use: Yes     Alcohol/week: 2 0 - 4 0 standard drinks     Types: 2 - 4 Cans of beer per week    Drug use: No    Sexual activity: Not on file       Current Outpatient Medications on File Prior to Visit   Medication Sig    Ascorbic Acid (VITAMIN C PO) 500 mg    cholecalciferol (VITAMIN D3) 1,000 units tablet Take 1,000 Units by mouth daily    hydrochlorothiazide (HYDRODIURIL) 25 mg tablet 25 mg    Saw Palmetto, Serenoa repens, (SAW PALMETTO PO) Take by mouth    Zinc Sulfate (ZINC 15 PO) Take by mouth    [DISCONTINUED] doxycycline monohydrate (MONODOX) 100 mg capsule TAKE 1 CAPSULE BY MOUTH TWICE A DAY FOR 21 DAYS    [DISCONTINUED] meloxicam (MOBIC) 7 5 mg tablet Take 1 tablet (7 5 mg total) by mouth 2 (two) times a day as needed for moderate pain     No current facility-administered medications on file prior to visit  Allergies   Allergen Reactions    Tamsulosin      Joint pain with alfuzosin also           Physical Exam    /74   Pulse 67   Temp 97 6 °F (36 4 °C)   Resp 20   Ht 5' 11 5" (1 816 m)   Wt 109 kg (239 lb 6 4 oz)   BMI 32 92 kg/m²     Constitutional: normal, well developed, well nourished, alert, in no distress and non-toxic and no overt pain behavior  and obese  Eyes: anicteric  HEENT: grossly intact  Neck: supple, symmetric, trachea midline and no masses   Pulmonary:even and unlabored  Cardiovascular:No edema or pitting edema present  Skin:Normal without rashes or lesions and well hydrated  Psychiatric:Mood and affect appropriate  Neurologic:Cranial Nerves II-XII grossly intact Sensation grossly intact; no clonus negative barry's  Reflexes 2+ and brisk  SLR negative bilaterally  Spurling's maneuver negative bilaterally  Musculoskeletal:normal gait  5/5 strength bilaterally with AROM in all extremities  Normal heel toe and tip toe walking  Significant pain with lumbar facet loading bilaterally and with lateral spine rotation  ttp over lumbar paraspinal muscles  Negative edis's test, negative gaenslen's negative SIJ loading bilaterally  Imaging    LUMBAR SPINE     INDICATION:   M54 50: Low back pain, unspecified  G89 29: Other chronic pain      COMPARISON:  None     VIEWS:  XR SPINE LUMBAR 2 OR 3 VIEWS INJURY        FINDINGS:     There are 5 non rib bearing lumbar vertebral bodies       There is no evidence of acute fracture or destructive osseous lesion  Note is made of rudimentary ribs at L1      Alignment is unremarkable       Facet joint degenerative arthropathy at L4-5 and L5-S1  Mild multilevel degenerative disc disease with endplate sclerosis and anterior osteophytes      The pedicles appear intact      Soft tissues are unremarkable      IMPRESSION:     No acute osseous abnormality        Mild multilevel degenerative changes

## 2022-09-26 NOTE — PROGRESS NOTES
Assessment  1  Lumbar spondylosis - Bilateral    Greater than 80% relief of pain with improved ability to participate with IADLs after bilateral L3, L4, L5 medial branch blocks #1   Previously reported the following symptomatology:     Axial low back pain described primarily by arthritic features  Aching, nagging, indolent, stabbing, throbbing features in axial low back without radicular components  5/5 strength bilaterally, negative SLR  Positive facet loading maneuvers in lumbar spine elicited pain, positive tenderness to palpation over lumbar paraspinal muscles  Findings correlate with prominent lumbar facet arthropathy seen throughout axial low back on x-ray  Currently he is neurologically intact without gait instability, saddle anesthesia or bowel/bladder abnormality  Risks, benefits alternative to medial branch blocks and subsequent radiofrequency ablation of successful thoroughly discussed with patient  Handouts provided questions answered to patient satisfaction  Plan  -Bilateral L3, L4, L5 medial branch blocks #2; rtc 2 weeks post procedure (patient to call back to schedule)  -Meloxicam 7 5 mg b i d  prn pain prescribed  Educated to wean given elevated BP and feet swelling  Patient educated regarding bleeding risk of taking this medication as well as not taking any other nonsteroidal anti-inflammatory medications while taking this medication; counseled thoroughly regarding potential risk of Cardiovascular injury, Kidney injury, Gastrointestinal ulceration/bleeding  Patient voiced understanding  -continue physical therapy for lumbar facet arthropathy; Physician directed home exercise plan as per [facility] demonstrated and handouts provided that patient plans to participate with for 1 hour, twice a week for the next 6 weeks  There are risks associated with opioid medications, including dependence, addiction and tolerance   The patient understands and agrees to use these medications only as prescribed  Potential side effects of the medications include, but are not limited to, constipation, drowsiness, addiction, impaired judgment and risk of fatal overdose if not taken as prescribed  The patient was warned against driving while taking sedation medications or operating heavy machinery  The patient voiced understanding  Sharing medications is a felony  At this point in time, the patient is showing no signs of addiction, abuse, diversion or suicidal ideation  [place] Prescription Drug Monitoring Program report was reviewed and was appropriate      Complete risks and benefits including bleeding, infection, tissue reaction, nerve injury and allergic reaction were discussed  The approach was demonstrated using models and literature was provided  Verbal and written consent was obtained  My impressions and treatment recommendations were discussed in detail with the patient who verbalized understanding and had no further questions  Discharge instructions were provided  I personally saw and examined the patient and I agree with the above discussed plan of care  My impressions and treatment recommendations were discussed in detail with the patient who verbalized understanding and had no further questions  Discharge instructions were provided  I personally saw and examined the patient and I agree with the above discussed plan of care  New Medications Ordered This Visit   Medications    hydrochlorothiazide (HYDRODIURIL) 25 mg tablet     Si mg       History of Present Illness    Greater than 80% relief of pain with improved ability to participate with IADLs after bilateral L3, L4, L5 medial branch blocks #1   Previously reported the following symptomatology:     Rosie Pastrana is a 75 y o  male  With pmhx of lyme disease, urinary stones/frequency presenting with a past medical history of chronic low back pain described primarily as arthritic in nature    He describes 8/10 low back pain that is worse in the mornings and worse at the end of the day  The pain is characterized by achy, nagging, indolent, crampy, stabbing pain in his axial low back  The patient describes that the pain is worse with standing for long periods of time on hard surfaces as well as with walking  The patient is a very active individual and feels as though this pain compromises his participation with independent activities of daily living  The pain can be debilitating at times and contribute to significant disability, compromising overall activity and independent activities of daily living  He has tried physical therapy with good relief of symptoms  Medications the patient has tried in the past include nsaids  He describes no radicular symptoms and has good strength  He denies any weakness numbness or paresthesias  The patient denies any saddle anesthesia, gait abnormality or incontinence  I have personally reviewed and/or updated the patient's past medical history, past surgical history, family history, social history, current medications, allergies, and vital signs today  Review of Systems   Constitutional: Positive for activity change  HENT: Negative  Eyes: Negative  Respiratory: Negative  Cardiovascular: Negative  Gastrointestinal: Negative  Endocrine: Negative  Genitourinary: Negative  Musculoskeletal: Positive for arthralgias, back pain, gait problem and myalgias  Skin: Negative  Allergic/Immunologic: Negative  Neurological: Negative for weakness and numbness  Hematological: Negative  Psychiatric/Behavioral: Negative  All other systems reviewed and are negative        Patient Active Problem List   Diagnosis    Calculus of kidney    Benign prostatic hyperplasia without lower urinary tract symptoms    Elevated PSA    Chronic bilateral low back pain without sciatica    Lumbar spondylosis - Bilateral       Past Medical History:   Diagnosis Date    COVID     Kidney stone     Lyme disease     Urinary frequency        Past Surgical History:   Procedure Laterality Date    CARPAL TUNNEL RELEASE      COLONOSCOPY      CYSTOSCOPY W/ STONE MANIPULATION      FL GUIDED NEEDLE PLAC BX/ASP/INJ  9/15/2022    NERVE BLOCK Bilateral 9/15/2022    Procedure: BLOCK MEDIAL BRANCH L3, L4, L5;  Surgeon: Tito Brock MD;  Location: [facility];  Service: Pain Management        Family History   Problem Relation Age of Onset    Diabetes Father     Heart disease Father     Diabetes Brother        Social History     Occupational History    Occupation:   Tobacco Use    Smoking status: Never Smoker    Smokeless tobacco: Never Used   Vaping Use    Vaping Use: Never used   Substance and Sexual Activity    Alcohol use: Yes     Alcohol/week: 2 0 - 4 0 standard drinks     Types: 2 - 4 Cans of beer per week    Drug use: No    Sexual activity: Not on file       Current Outpatient Medications on File Prior to Visit   Medication Sig    Ascorbic Acid (VITAMIN C PO) 500 mg    cholecalciferol (VITAMIN D3) 1,000 units tablet Take 1,000 Units by mouth daily    hydrochlorothiazide (HYDRODIURIL) 25 mg tablet 25 mg    Saw Palmetto, Serenoa repens, (SAW PALMETTO PO) Take by mouth    Zinc Sulfate (ZINC 15 PO) Take by mouth    [DISCONTINUED] doxycycline monohydrate (MONODOX) 100 mg capsule TAKE 1 CAPSULE BY MOUTH TWICE A DAY FOR 21 DAYS    [DISCONTINUED] meloxicam (MOBIC) 7 5 mg tablet Take 1 tablet (7 5 mg total) by mouth 2 (two) times a day as needed for moderate pain     No current facility-administered medications on file prior to visit  Allergies   Allergen Reactions    Tamsulosin      Joint pain with alfuzosin also           Physical Exam    /74   Pulse 67   Temp 97 6 °F (36 4 °C)   Resp 20   Ht 5' 11 5" (1 816 m)   Wt 109 kg (239 lb 6 4 oz)   BMI 32 92 kg/m²     Constitutional: normal, well developed, well nourished, alert, in no distress and non-toxic and no overt pain behavior  and obese  Eyes: anicteric  HEENT: grossly intact  Neck: supple, symmetric, trachea midline and no masses   Pulmonary:even and unlabored  Cardiovascular:No edema or pitting edema present  Skin:Normal without rashes or lesions and well hydrated  Psychiatric:Mood and affect appropriate  Neurologic:Cranial Nerves II-XII grossly intact Sensation grossly intact; no clonus negative barry's  Reflexes 2+ and brisk  SLR negative bilaterally  Spurling's maneuver negative bilaterally  Musculoskeletal:normal gait  5/5 strength bilaterally with AROM in all extremities  Normal heel toe and tip toe walking  Significant pain with lumbar facet loading bilaterally and with lateral spine rotation  ttp over lumbar paraspinal muscles  Negative edis's test, negative gaenslen's negative SIJ loading bilaterally  Imaging    LUMBAR SPINE     INDICATION:   M54 50: Low back pain, unspecified  G89 29: Other chronic pain      COMPARISON:  None     VIEWS:  XR SPINE LUMBAR 2 OR 3 VIEWS INJURY        FINDINGS:     There are 5 non rib bearing lumbar vertebral bodies       There is no evidence of acute fracture or destructive osseous lesion  Note is made of rudimentary ribs at L1      Alignment is unremarkable       Facet joint degenerative arthropathy at L4-5 and L5-S1  Mild multilevel degenerative disc disease with endplate sclerosis and anterior osteophytes      The pedicles appear intact      Soft tissues are unremarkable      IMPRESSION:     No acute osseous abnormality        Mild multilevel degenerative changes

## 2022-09-26 NOTE — PATIENT INSTRUCTIONS
Core Strengthening Exercises   WHAT YOU NEED TO KNOW:   Your core includes the muscles of your lower back, hip, pelvis, and abdomen  Core strengthening exercises help heal and strengthen these muscles  This helps prevent another injury, and keeps your pelvis, spine, and hips in the correct position  DISCHARGE INSTRUCTIONS:   Contact your healthcare provider if:   You have sharp or worsening pain during exercise or at rest     You have questions or concerns about your shoulder exercises  Safety tips:  Talk to your healthcare provider before you start an exercise program  A physical therapist can teach you how to do core strengthening exercises safely  Do the exercises on a mat or firm surface  A firm surface will support your spine and prevent low back pain  Do not do these exercises on a bed  Move slowly and smoothly  Avoid fast or jerky motions  Stop if you feel pain  Core exercises should not be painful  Stop if you feel pain  Breathe normally during core exercises  Do not hold your breath  This may cause an increase in blood pressure and prevent muscle strengthening  Your healthcare provider will tell you when to inhale and exhale during the exercise  Begin all of your exercises with abdominal bracing  Abdominal bracing helps warm up your core muscles  You can also practice abdominal bracing throughout the day  Lie on your back with your knees bent and feet flat on the floor  Place your arms in a relaxed position beside your body  Tighten your abdominal muscles  Pull your belly button in and up toward your spine  Hold for 5 seconds  Relax your muscles  Repeat 10 times  Core strengthening exercises: Your healthcare provider will tell you how often to do these exercises  The provider will also tell you how many repetitions of each exercise you should do  Hold each exercise for 5 seconds or as directed  As you get stronger, increase your hold to 10 to 15 seconds   You can do some of these exercises on a stability ball, or with a weight  Ask your healthcare provider how to use a stability ball or weight for these exercises:  Bridging:  Lie on your back with your knees bent and feet flat on the floor  Rest your arms at your side  Tighten your buttocks, and then lift your hips 1 inch off the floor  Hold for 5 seconds  When you can do this exercise without pain for 10 seconds, increase the distance you lift your hips  A good goal is to be able to lift your hips so that your shoulders, hips, and knees are in a straight line  Dead bug:  Lie on your back with your knees bent and feet flat on the floor  Place your arms in a relaxed position beside your body  Begin with abdominal bracing  Next, raise one leg, keeping your knee bent  Hold for 5 seconds  Repeat with the other leg  When you can do this exercise without pain for 10 to 15 seconds, you may raise one straight leg and hold  Repeat with the other leg  Quadruped:  Place your hands and knees on the floor  Keep your wrists directly below your shoulders and your knees directly below your hips  Pull your belly button in toward your spine  Do not flatten or arch your back  Tighten your abdominal muscles below your belly button  Hold for 5 seconds  When you can do this exercise without pain for 10 to 15 seconds, you may extend one arm and hold  Repeat on the other side  Side bridge exercises:      Standing side bridge:  Stand next to a wall and extend one arm toward the wall  Place your palm flat on the wall with your fingers pointing upward  Begin with abdominal bracing  Next, without moving your feet, slowly bend your arm to 90 degrees  Hold for 5 seconds  Repeat on the other side  When you can do this exercise without pain for 10 to 15 seconds, you may do the bent leg side bridge on the floor  Bent leg side bridge:  Lie on one side with your legs, hips, and shoulders in a straight line   Prop yourself up onto your forearm so your elbow is directly below your shoulder  Bend your knees back to 90 degrees  Begin with abdominal bracing  Next, lift your hips and balance yourself on your forearm and knees  Hold for 5 seconds  Repeat on the other side  When you can do this exercise without pain for 10 to 15 seconds, you may do the straight leg side bridge on the floor  Straight leg side bridge:  Lie on one side with your legs, hips, and shoulders in a straight line  Prop yourself up onto your forearm so your elbow is directly below your shoulder  Begin with abdominal bracing  Lift your hips off the floor and balance yourself on your forearm and the outside of your flexed foot  Do not let your ankle bend sideways  Hold for 5 seconds  Repeat on the other side  When you can do this exercise without pain for 10 to 15 seconds, ask your healthcare provider for more advanced exercises  Superman:  Lie on your stomach  Extend your arms forward on the floor  Tighten your abdominal muscles and lift your right hand and left leg off the floor  Hold this position  Slowly return to the starting position  Tighten your abdominal muscles and lift your left hand and right leg off the floor  Hold this position  Slowly return to the starting position  Clam:  Lie on your side with your knees bent  Put your bottom arm under your head to keep your neck in line  Put your top hand on your hip to keep your pelvis from moving  Put your heels together, and keep them together during this exercise  Slowly raise your top knee toward the ceiling  Then lower your leg so your knees are together  Repeat this exercise 10 times  Then switch sides and do the exercise 10 times with the other leg  Curl up:  Lie on your back with your knees bent and feet flat on the floor  Place your hands, palms down, underneath your lower back  Next, with your elbows on the floor, lift your shoulders and chest 2 to 3 inches off the floor   Keep your head in line with your shoulders  Hold this position  Slowly return to the starting position  Straight leg raises:  Lie on your back with one leg straight  Bend the other knee and place your foot flat on the floor  Tighten your abdominal muscles  Keep your leg straight and slowly lift it straight up 6 to 12 inches off the floor  Hold this position  Lower your leg slowly  Do as many repetitions as directed on this side  Repeat with the other leg  Plank:  Lie on your stomach  Bend your elbows and place your forearms flat on the floor  Lift your chest, stomach, and knees off the floor  Make sure your elbows are below your shoulders  Your body should be in a straight line  Do not let your hips or lower back sink to the ground  Squeeze your abdominal muscles together and hold for 15 seconds  To make this exercise harder, hold for 30 seconds or lift 1 leg at a time  Bicycles:  Lie on your back  Bend both knees and bring them toward your chest  Your calves should be parallel to the floor  Place the palms of your hands on the back of your head  Straighten your right leg and keep it lifted 2 inches off the floor  Raise your head and shoulders off the floor and twist towards your left  Keep your head and shoulders lifted  Bend your right knee while you straighten your left leg  Keep your left leg 2 inches off the floor  Twist your head and chest towards the left leg  Continue to straighten 1 leg at a time and twist        Follow up with your doctor as directed:  Write down your questions so you remember to ask them during your visits  © Copyright [employer] 2022 Information is for End User's use only and may not be sold, redistributed or otherwise used for commercial purposes  All illustrations and images included in CareNotes® are the copyrighted property of [employer]  or [facility] Marlon Dooley   The above information is an  only   It is not intended as medical advice for individual conditions or treatments  Talk to your doctor, nurse or pharmacist before following any medical regimen to see if it is safe and effective for you

## 2022-09-29 ENCOUNTER — HOSPITAL ENCOUNTER (OUTPATIENT)
Facility: HOSPITAL | Age: 67
Setting detail: OUTPATIENT SURGERY
Discharge: HOME | End: 2022-09-29
Attending: ANESTHESIOLOGY | Admitting: ANESTHESIOLOGY
Payer: COMMERCIAL

## 2022-09-29 ENCOUNTER — APPOINTMENT (OUTPATIENT)
Dept: RADIOLOGY | Facility: HOSPITAL | Age: 67
End: 2022-09-29
Payer: COMMERCIAL

## 2022-09-29 VITALS
SYSTOLIC BLOOD PRESSURE: 135 MMHG | TEMPERATURE: 97.6 F | DIASTOLIC BLOOD PRESSURE: 69 MMHG | HEIGHT: 72 IN | WEIGHT: 239 LBS | OXYGEN SATURATION: 95 % | RESPIRATION RATE: 18 BRPM | BODY MASS INDEX: 32.37 KG/M2 | HEART RATE: 66 BPM

## 2022-09-29 PROCEDURE — 64493 INJ PARAVERT F JNT L/S 1 LEV: CPT | Performed by: ANESTHESIOLOGY

## 2022-09-29 PROCEDURE — 64494 INJ PARAVERT F JNT L/S 2 LEV: CPT | Performed by: ANESTHESIOLOGY

## 2022-09-29 PROCEDURE — 77002 NEEDLE LOCALIZATION BY XRAY: CPT

## 2022-09-29 RX ORDER — LIDOCAINE HYDROCHLORIDE 10 MG/ML
10 INJECTION, SOLUTION EPIDURAL; INFILTRATION; INTRACAUDAL; PERINEURAL ONCE
Status: COMPLETED | OUTPATIENT
Start: 2022-09-29 | End: 2022-09-29

## 2022-09-29 RX ORDER — BUPIVACAINE HYDROCHLORIDE 2.5 MG/ML
INJECTION, SOLUTION EPIDURAL; INFILTRATION; INTRACAUDAL AS NEEDED
Status: DISCONTINUED | OUTPATIENT
Start: 2022-09-29 | End: 2022-09-29 | Stop reason: HOSPADM

## 2022-09-29 RX ORDER — METHYLPREDNISOLONE ACETATE 80 MG/ML
80 INJECTION, SUSPENSION INTRA-ARTICULAR; INTRALESIONAL; INTRAMUSCULAR; PARENTERAL; SOFT TISSUE ONCE
Status: COMPLETED | OUTPATIENT
Start: 2022-09-29 | End: 2022-09-29

## 2022-09-29 RX ORDER — ALPRAZOLAM 0.5 MG/1
0.5 TABLET ORAL ONCE
Status: COMPLETED | OUTPATIENT
Start: 2022-09-29 | End: 2022-09-29

## 2022-09-29 RX ORDER — BUPIVACAINE HCL/PF 2.5 MG/ML
10 VIAL (ML) INJECTION ONCE
Status: DISCONTINUED | OUTPATIENT
Start: 2022-09-29 | End: 2022-09-29 | Stop reason: HOSPADM

## 2022-09-29 RX ADMIN — ALPRAZOLAM 0.5 MG: 0.5 TABLET ORAL at 12:09

## 2022-09-29 NOTE — INTERVAL H&P NOTE
H&P reviewed  After examining the patient I find no changes in the patients condition since the H&P had been written      Vitals:    09/29/22 1204   BP: 160/77   Pulse: 72   Resp: 18   Temp: 98 1 °F (36 7 °C)   SpO2: 94%

## 2022-09-29 NOTE — OP NOTE
OPERATIVE REPORT  PATIENT NAME: Shania García    :  1955  MRN: [id number]  Pt Location:  GI ROOM 01    SURGERY DATE: 2022    Surgeon(s) and Role: Yosef Giron MD - Primary    Preop Diagnosis:  Lumbar spondylosis [M47 816]    Post-Op Diagnosis Codes:     * Lumbar spondylosis [M47 816]    Procedure(s) (LRB):  BLOCK MEDIAL BRANCH L3, L4, L5 #2 (Bilateral)    Specimen(s):  * No specimens in log *    Estimated Blood Loss:   Minimal    Drains:  * No LDAs found *    Anesthesia Type:   Local    Operative Indications:  Lumbar spondylosis [M47 816]    Operative Findings:  Bilateral L3, L4, L5 medial branch nerve regions identified under fluoroscopic guidance       Complications:   None    Procedure and Technique:  Please see detailed procedure note    I was present for the entire procedure    Patient Disposition:  PACU     SIGNATURE: Brett Joel MD  DATE: 2022  TIME: 12:44 PM

## 2022-09-29 NOTE — PROCEDURES
Pre-procedure Diagnosis: Lumbar Facet Arthropathy  Post-procedure Diagnosis: Lumbar Facet Arthropathy  Procedure Title(s):  [BILATERAL L3, L4, L5] medial branch nerve blocks [(CONFIRMATORY)]  Attending Surgeon:   Edna Reyes MD  Anesthesia:   Local     Indications: The patient is a 75y o  year-old male with a diagnosis of lumbar facet arthropathy  The patient's history and physical exam were reviewed  The risks, benefits and alternatives to the procedure were discussed, and all questions were answered to the patient's satisfaction  The patient agreed to proceed, and written informed consent was obtained  Procedure in Detail: The patient was brought into the procedure room and placed in the prone position on the fluoroscopy table  The area of the lumbar spine was prepped with chloraprep and then draped in a sterile manner  AP fluoroscopy was used to identify the [L3-L5] levels on the [LEFT] side  The C-arm was obliqued to visualize the junction of the superior articulate process and transverse process  The sacral ala was identified and marked  The skin in these identified areas was anesthetized with 1% lidocaine  A 22-gauge, 3½-inch spinal needle was advanced toward each of these points under fluoroscopic guidance  Once bone was contacted, negative aspiration was confirmed and [1-mL] of a [6mL]mixture of [5mL] [0 25% bupivicaine] and 1mL of 80mg/mL Depomedrol was injected at each level  (The same procedure was performed on the RIGHT side )    After the procedure was completed, the patient's back was cleaned and bandages were placed at the needle insertion sites  Disposition: The patient tolerated the procedure well, and there were no apparent complications  The patient was taken to the recovery area where written discharge instructions for the procedure were given  The patient was given a pain diary to determine if the patient's pain improves following the injection   Once the diary is returned we will consider next appropriate course of treatment      Postoperative pain relief [WAS] significant    Estimated Blood Loss: None  Specimens Obtained: N/A

## 2022-09-29 NOTE — DISCHARGE INSTRUCTIONS
YOUR 2 WEEK FOLLOW UP HAS BEEN SCHEDULED; IF YOU WISH TO CHANGE THE FOLLOW UP, PLEASE CALL THE [facility] [place]: [phone]    MEDIAL BRANCH BLOCK DISCHARGE INSTRUCTIONS  ACTIVITY  Please do activities that will bring the normal pain that we are rating  For example, if vacuuming or walking increases the painm do that  Gilberto twill give the most accurate response to the diary  You may shower, but no tub baths today, or applied heat  CARE OF THE INJECTION SITE  This area may be numb for several hours after the injection  Notify the [facility] if you have any of the following: redness, drainage, swelling or fever above 100°F     SPECIAL INSTRUCTIONS  Please return the MBB diary to our office by mail, fax, or drop it off  MEDICATIONS  Please do not take any break through or short acting pain medications for 8 hours after the block  Continue to take all routine medications  Our office may have instructed you to hold some medications  You may resume _______________________________________________  If you have any problems specifically related to your procedure, please call our office at [phone]  Problems not related to your procedure should be directed at your primary care physician  Lumbar Radiofrequency Ablation   WHAT YOU NEED TO KNOW:   What do I need to know about lumbar radiofrequency ablation? Lumbar radiofrequency ablation (RFA) is a procedure used to treat facet joint pain in your lower back  Facet joints are found at the back of each vertebra  A needle electrode is used to send electrical currents to the nerves in your facet joint  The electrical currents create heat that damages the nerve so it cannot send pain signals  How do I prepare for lumbar RFA? Your healthcare provider will talk to you about how to prepare for this procedure  He may tell you not to eat or drink anything after midnight on the day of your procedure   He will tell you what medicines to take or not take on the day of your procedure  What will happen during lumbar RFA? You will lie on your stomach  You will be given local anesthesia to numb the area of your back where the needle electrode will be inserted  You may be given a sedative to help keep you relaxed  You may still feel pressure or pushing during the procedure, but you should not feel any pain  Your healthcare provider will use fluoroscopy (a type of x-ray) to guide the needle electrode to the nerves near your facet joint  Your healthcare provider may touch the affected nerve to make sure the needle electrode is in the right place  You will feel tingling or pressure when he does this  He will then apply local anesthesia to the nerve to numb it  This will prevent you from feeling pain when he applies heat to the nerve  Your healthcare provider will then apply heat to the nerve using the needle electrode  He may need to apply heat to more than one nerve  He will remove the needle electrode and apply a bandage over the area  What are the risks of lumbar RFA? You may have pain, numbness, tingling, or burning in the area where the lumbar RFA was done  These normally go away within 6 weeks  The needle electrode may injure your spinal nerves  This may cause permanent leg weakness or nerve pain  CARE AGREEMENT:   You have the right to help plan your care  Learn about your health condition and how it may be treated  Discuss treatment options with your healthcare providers to decide what care you want to receive  You always have the right to refuse treatment  The above information is an  only  It is not intended as medical advice for individual conditions or treatments  Talk to your doctor, nurse or pharmacist before following any medical regimen to see if it is safe and effective for you    © Copyright [employer] 2022 Information is for End User's use only and may not be sold, redistributed or otherwise used for commercial purposes   All illustrations and images included in CareNotes® are the copyrighted property of [employer]  or [facility] Marlon Pedroza

## 2022-10-14 ENCOUNTER — OFFICE VISIT (OUTPATIENT)
Dept: PAIN MEDICINE | Facility: CLINIC | Age: 67
End: 2022-10-14
Payer: COMMERCIAL

## 2022-10-14 VITALS
DIASTOLIC BLOOD PRESSURE: 68 MMHG | TEMPERATURE: 97.1 F | SYSTOLIC BLOOD PRESSURE: 112 MMHG | BODY MASS INDEX: 32.59 KG/M2 | RESPIRATION RATE: 20 BRPM | HEIGHT: 72 IN | WEIGHT: 240.6 LBS

## 2022-10-14 DIAGNOSIS — M47.816 LUMBAR SPONDYLOSIS: Primary | ICD-10-CM

## 2022-10-14 PROCEDURE — 99213 OFFICE O/P EST LOW 20 MIN: CPT | Performed by: ANESTHESIOLOGY

## 2022-10-14 RX ORDER — LIDOCAINE HYDROCHLORIDE 10 MG/ML
10 INJECTION, SOLUTION EPIDURAL; INFILTRATION; INTRACAUDAL; PERINEURAL ONCE
OUTPATIENT
Start: 2022-10-14 | End: 2022-10-14

## 2022-10-14 RX ORDER — BUPIVACAINE HCL/PF 2.5 MG/ML
5 VIAL (ML) INJECTION ONCE
OUTPATIENT
Start: 2022-10-14 | End: 2022-10-14

## 2022-10-14 RX ORDER — METHYLPREDNISOLONE ACETATE 80 MG/ML
80 INJECTION, SUSPENSION INTRA-ARTICULAR; INTRALESIONAL; INTRAMUSCULAR; PARENTERAL; SOFT TISSUE ONCE
OUTPATIENT
Start: 2022-10-14 | End: 2022-10-14

## 2022-10-14 NOTE — PATIENT INSTRUCTIONS
Core Strengthening Exercises   WHAT YOU NEED TO KNOW:   Your core includes the muscles of your lower back, hip, pelvis, and abdomen  Core strengthening exercises help heal and strengthen these muscles  This helps prevent another injury, and keeps your pelvis, spine, and hips in the correct position  DISCHARGE INSTRUCTIONS:   Contact your healthcare provider if:   You have sharp or worsening pain during exercise or at rest     You have questions or concerns about your shoulder exercises  Safety tips:  Talk to your healthcare provider before you start an exercise program  A physical therapist can teach you how to do core strengthening exercises safely  Do the exercises on a mat or firm surface  A firm surface will support your spine and prevent low back pain  Do not do these exercises on a bed  Move slowly and smoothly  Avoid fast or jerky motions  Stop if you feel pain  Core exercises should not be painful  Stop if you feel pain  Breathe normally during core exercises  Do not hold your breath  This may cause an increase in blood pressure and prevent muscle strengthening  Your healthcare provider will tell you when to inhale and exhale during the exercise  Begin all of your exercises with abdominal bracing  Abdominal bracing helps warm up your core muscles  You can also practice abdominal bracing throughout the day  Lie on your back with your knees bent and feet flat on the floor  Place your arms in a relaxed position beside your body  Tighten your abdominal muscles  Pull your belly button in and up toward your spine  Hold for 5 seconds  Relax your muscles  Repeat 10 times  Core strengthening exercises: Your healthcare provider will tell you how often to do these exercises  The provider will also tell you how many repetitions of each exercise you should do  Hold each exercise for 5 seconds or as directed  As you get stronger, increase your hold to 10 to 15 seconds   You can do some of these exercises on a stability ball, or with a weight  Ask your healthcare provider how to use a stability ball or weight for these exercises:  Bridging:  Lie on your back with your knees bent and feet flat on the floor  Rest your arms at your side  Tighten your buttocks, and then lift your hips 1 inch off the floor  Hold for 5 seconds  When you can do this exercise without pain for 10 seconds, increase the distance you lift your hips  A good goal is to be able to lift your hips so that your shoulders, hips, and knees are in a straight line  Dead bug:  Lie on your back with your knees bent and feet flat on the floor  Place your arms in a relaxed position beside your body  Begin with abdominal bracing  Next, raise one leg, keeping your knee bent  Hold for 5 seconds  Repeat with the other leg  When you can do this exercise without pain for 10 to 15 seconds, you may raise one straight leg and hold  Repeat with the other leg  Quadruped:  Place your hands and knees on the floor  Keep your wrists directly below your shoulders and your knees directly below your hips  Pull your belly button in toward your spine  Do not flatten or arch your back  Tighten your abdominal muscles below your belly button  Hold for 5 seconds  When you can do this exercise without pain for 10 to 15 seconds, you may extend one arm and hold  Repeat on the other side  Side bridge exercises:      Standing side bridge:  Stand next to a wall and extend one arm toward the wall  Place your palm flat on the wall with your fingers pointing upward  Begin with abdominal bracing  Next, without moving your feet, slowly bend your arm to 90 degrees  Hold for 5 seconds  Repeat on the other side  When you can do this exercise without pain for 10 to 15 seconds, you may do the bent leg side bridge on the floor  Bent leg side bridge:  Lie on one side with your legs, hips, and shoulders in a straight line   Prop yourself up onto your forearm so your elbow is directly below your shoulder  Bend your knees back to 90 degrees  Begin with abdominal bracing  Next, lift your hips and balance yourself on your forearm and knees  Hold for 5 seconds  Repeat on the other side  When you can do this exercise without pain for 10 to 15 seconds, you may do the straight leg side bridge on the floor  Straight leg side bridge:  Lie on one side with your legs, hips, and shoulders in a straight line  Prop yourself up onto your forearm so your elbow is directly below your shoulder  Begin with abdominal bracing  Lift your hips off the floor and balance yourself on your forearm and the outside of your flexed foot  Do not let your ankle bend sideways  Hold for 5 seconds  Repeat on the other side  When you can do this exercise without pain for 10 to 15 seconds, ask your healthcare provider for more advanced exercises  Superman:  Lie on your stomach  Extend your arms forward on the floor  Tighten your abdominal muscles and lift your right hand and left leg off the floor  Hold this position  Slowly return to the starting position  Tighten your abdominal muscles and lift your left hand and right leg off the floor  Hold this position  Slowly return to the starting position  Clam:  Lie on your side with your knees bent  Put your bottom arm under your head to keep your neck in line  Put your top hand on your hip to keep your pelvis from moving  Put your heels together, and keep them together during this exercise  Slowly raise your top knee toward the ceiling  Then lower your leg so your knees are together  Repeat this exercise 10 times  Then switch sides and do the exercise 10 times with the other leg  Curl up:  Lie on your back with your knees bent and feet flat on the floor  Place your hands, palms down, underneath your lower back  Next, with your elbows on the floor, lift your shoulders and chest 2 to 3 inches off the floor   Keep your head in line with your shoulders  Hold this position  Slowly return to the starting position  Straight leg raises:  Lie on your back with one leg straight  Bend the other knee and place your foot flat on the floor  Tighten your abdominal muscles  Keep your leg straight and slowly lift it straight up 6 to 12 inches off the floor  Hold this position  Lower your leg slowly  Do as many repetitions as directed on this side  Repeat with the other leg  Plank:  Lie on your stomach  Bend your elbows and place your forearms flat on the floor  Lift your chest, stomach, and knees off the floor  Make sure your elbows are below your shoulders  Your body should be in a straight line  Do not let your hips or lower back sink to the ground  Squeeze your abdominal muscles together and hold for 15 seconds  To make this exercise harder, hold for 30 seconds or lift 1 leg at a time  Bicycles:  Lie on your back  Bend both knees and bring them toward your chest  Your calves should be parallel to the floor  Place the palms of your hands on the back of your head  Straighten your right leg and keep it lifted 2 inches off the floor  Raise your head and shoulders off the floor and twist towards your left  Keep your head and shoulders lifted  Bend your right knee while you straighten your left leg  Keep your left leg 2 inches off the floor  Twist your head and chest towards the left leg  Continue to straighten 1 leg at a time and twist        Follow up with your doctor as directed:  Write down your questions so you remember to ask them during your visits  © Copyright [employer] 2022 Information is for End User's use only and may not be sold, redistributed or otherwise used for commercial purposes  All illustrations and images included in CareNotes® are the copyrighted property of [employer]  or [facility] Marlon Dooley   The above information is an  only   It is not intended as medical advice for individual conditions or treatments  Talk to your doctor, nurse or pharmacist before following any medical regimen to see if it is safe and effective for you  Lumbar Radiofrequency Ablation   WHAT YOU NEED TO KNOW:   What do I need to know about lumbar radiofrequency ablation? Lumbar radiofrequency ablation (RFA) is a procedure used to treat facet joint pain in your lower back  Facet joints are found at the back of each vertebra  A needle electrode is used to send electrical currents to the nerves in your facet joint  The electrical currents create heat that damages the nerve so it cannot send pain signals  How do I prepare for lumbar RFA? Your healthcare provider will talk to you about how to prepare for this procedure  He may tell you not to eat or drink anything after midnight on the day of your procedure  He will tell you what medicines to take or not take on the day of your procedure  What will happen during lumbar RFA? You will lie on your stomach  You will be given local anesthesia to numb the area of your back where the needle electrode will be inserted  You may be given a sedative to help keep you relaxed  You may still feel pressure or pushing during the procedure, but you should not feel any pain  Your healthcare provider will use fluoroscopy (a type of x-ray) to guide the needle electrode to the nerves near your facet joint  Your healthcare provider may touch the affected nerve to make sure the needle electrode is in the right place  You will feel tingling or pressure when he does this  He will then apply local anesthesia to the nerve to numb it  This will prevent you from feeling pain when he applies heat to the nerve  Your healthcare provider will then apply heat to the nerve using the needle electrode  He may need to apply heat to more than one nerve  He will remove the needle electrode and apply a bandage over the area  What are the risks of lumbar RFA?   You may have pain, numbness, tingling, or burning in the area where the lumbar RFA was done  These normally go away within 6 weeks  The needle electrode may injure your spinal nerves  This may cause permanent leg weakness or nerve pain  CARE AGREEMENT:   You have the right to help plan your care  Learn about your health condition and how it may be treated  Discuss treatment options with your healthcare providers to decide what care you want to receive  You always have the right to refuse treatment  The above information is an  only  It is not intended as medical advice for individual conditions or treatments  Talk to your doctor, nurse or pharmacist before following any medical regimen to see if it is safe and effective for you  © Copyright [address] 2022 Information is for End User's use only and may not be sold, redistributed or otherwise used for commercial purposes   All illustrations and images included in CareNotes® are the copyrighted property of [employer]  or [address] [place] [address]

## 2022-10-14 NOTE — PROGRESS NOTES
Assessment  1  Lumbar spondylosis - Bilateral  -     Case request operating room: RHIZOTOMY LUMBAR L3, L4, L5 medial branch nerves; Standing  -     Case request operating room: RHIZOTOMY LUMBAR L3, L4, L5 medial branch nerves    Greater than 80% relief of pain with improved ability to participate with IADLs after bilateral L3, L4, L5 medial branch blocks #1 and #2  Previously reported the following symptomatology:     Axial low back pain described primarily by arthritic features  Aching, nagging, indolent, stabbing, throbbing features in axial low back without radicular components  5/5 strength bilaterally, negative SLR  Positive facet loading maneuvers in lumbar spine elicited pain, positive tenderness to palpation over lumbar paraspinal muscles  Findings correlate with prominent lumbar facet arthropathy seen throughout axial low back on x-ray  Currently he is neurologically intact without gait instability, saddle anesthesia or bowel/bladder abnormality  Risks, benefits alternative to medial branch blocks and subsequent radiofrequency ablation of successful thoroughly discussed with patient  Handouts provided questions answered to patient satisfaction  Plan  -Bilateral L3, L4, L5 medial branch nerve radiofrequency ablation; rtc 2 weeks post procedure (patient to call back to schedule)  -Meloxicam 7 5 mg b i d  prn pain prescribed  Educated to wean given elevated BP and feet swelling  Patient educated regarding bleeding risk of taking this medication as well as not taking any other nonsteroidal anti-inflammatory medications while taking this medication; counseled thoroughly regarding potential risk of Cardiovascular injury, Kidney injury, Gastrointestinal ulceration/bleeding   Patient voiced understanding  -continue physical therapy for lumbar facet arthropathy; Physician directed home exercise plan as per [facility] demonstrated and handouts provided that patient plans to participate with for 1 hour, twice a week for the next 6 weeks  There are risks associated with opioid medications, including dependence, addiction and tolerance  The patient understands and agrees to use these medications only as prescribed  Potential side effects of the medications include, but are not limited to, constipation, drowsiness, addiction, impaired judgment and risk of fatal overdose if not taken as prescribed  The patient was warned against driving while taking sedation medications or operating heavy machinery  The patient voiced understanding  Sharing medications is a felony  At this point in time, the patient is showing no signs of addiction, abuse, diversion or suicidal ideation  [place] Prescription Drug Monitoring Program report was reviewed and was appropriate      Complete risks and benefits including bleeding, infection, tissue reaction, nerve injury and allergic reaction were discussed  The approach was demonstrated using models and literature was provided  Verbal and written consent was obtained  My impressions and treatment recommendations were discussed in detail with the patient who verbalized understanding and had no further questions  Discharge instructions were provided  I personally saw and examined the patient and I agree with the above discussed plan of care  My impressions and treatment recommendations were discussed in detail with the patient who verbalized understanding and had no further questions  Discharge instructions were provided  I personally saw and examined the patient and I agree with the above discussed plan of care  No orders of the defined types were placed in this encounter  History of Present Illness    Greater than 80% relief of pain with improved ability to participate with IADLs after bilateral L3, L4, L5 medial branch blocks #1 and #2    Previously reported the following symptomatology:     Dahiana Juárez is a 75 y o  male  With pmhx of lyme disease, urinary stones/frequency presenting with a past medical history of chronic low back pain described primarily as arthritic in nature  He describes 8/10 low back pain that is worse in the mornings and worse at the end of the day  The pain is characterized by achy, nagging, indolent, crampy, stabbing pain in his axial low back  The patient describes that the pain is worse with standing for long periods of time on hard surfaces as well as with walking  The patient is a very active individual and feels as though this pain compromises his participation with independent activities of daily living  The pain can be debilitating at times and contribute to significant disability, compromising overall activity and independent activities of daily living  He has tried physical therapy with good relief of symptoms  Medications the patient has tried in the past include nsaids  He describes no radicular symptoms and has good strength  He denies any weakness numbness or paresthesias  The patient denies any saddle anesthesia, gait abnormality or incontinence  I have personally reviewed and/or updated the patient's past medical history, past surgical history, family history, social history, current medications, allergies, and vital signs today  Review of Systems   Constitutional: Positive for activity change  HENT: Negative  Eyes: Negative  Respiratory: Negative  Cardiovascular: Negative  Gastrointestinal: Negative  Endocrine: Negative  Genitourinary: Negative  Musculoskeletal: Positive for arthralgias, back pain, gait problem and myalgias  Skin: Negative  Allergic/Immunologic: Negative  Neurological: Negative for weakness and numbness  Hematological: Negative  Psychiatric/Behavioral: Negative  All other systems reviewed and are negative        Patient Active Problem List   Diagnosis   • Calculus of kidney   • Benign prostatic hyperplasia without lower urinary tract symptoms   • Elevated PSA   • Chronic bilateral low back pain without sciatica   • Lumbar spondylosis - Bilateral       Past Medical History:   Diagnosis Date   • COVID    • Kidney stone    • Lyme disease    • Urinary frequency        Past Surgical History:   Procedure Laterality Date   • CARPAL TUNNEL RELEASE     • COLONOSCOPY     • CYSTOSCOPY W/ STONE MANIPULATION     • FL GUIDED NEEDLE PLAC BX/ASP/INJ  9/15/2022   • FL GUIDED NEEDLE PLAC BX/ASP/INJ  9/29/2022   • NERVE BLOCK Bilateral 9/15/2022    Procedure: BLOCK MEDIAL BRANCH L3, L4, L5;  Surgeon: Kerri Lucas MD;  Location: [facility];  Service: Pain Management    • NERVE BLOCK Bilateral 9/29/2022    Procedure: BLOCK MEDIAL BRANCH L3, L4, L5 #2;  Surgeon: Kerri Lucas MD;  Location: [facility];  Service: Pain Management        Family History   Problem Relation Age of Onset   • Diabetes Father    • Heart disease Father    • Diabetes Brother        Social History     Occupational History   • Occupation:   Tobacco Use   • Smoking status: Never Smoker   • Smokeless tobacco: Never Used   Vaping Use   • Vaping Use: Never used   Substance and Sexual Activity   • Alcohol use: Yes     Alcohol/week: 2 0 - 4 0 standard drinks     Types: 2 - 4 Cans of beer per week   • Drug use: No   • Sexual activity: Not on file       Current Outpatient Medications on File Prior to Visit   Medication Sig   • Ascorbic Acid (VITAMIN C PO) 500 mg   • cholecalciferol (VITAMIN D3) 1,000 units tablet Take 1,000 Units by mouth daily   • hydrochlorothiazide (HYDRODIURIL) 25 mg tablet 25 mg   • Saw Palmetto, Serenoa repens, (SAW PALMETTO PO) Take by mouth   • Zinc Sulfate (ZINC 15 PO) Take by mouth     No current facility-administered medications on file prior to visit  Allergies   Allergen Reactions   • Tamsulosin      Joint pain with alfuzosin also           Physical Exam    /68   Temp (!) 97 1 °F (36 2 °C)   Resp 20   Ht 5' 11 5" (1 816 m)   Wt 109 kg (240 lb 9 6 oz)   BMI 33 09 kg/m²     Constitutional: normal, well developed, well nourished, alert, in no distress and non-toxic and no overt pain behavior  and obese  Eyes: anicteric  HEENT: grossly intact  Neck: supple, symmetric, trachea midline and no masses   Pulmonary:even and unlabored  Cardiovascular:No edema or pitting edema present  Skin:Normal without rashes or lesions and well hydrated  Psychiatric:Mood and affect appropriate  Neurologic:Cranial Nerves II-XII grossly intact Sensation grossly intact; no clonus negative barry's  Reflexes 2+ and brisk  SLR negative bilaterally  Spurling's maneuver negative bilaterally  Musculoskeletal:normal gait  5/5 strength bilaterally with AROM in all extremities  Normal heel toe and tip toe walking  Significant pain with lumbar facet loading bilaterally and with lateral spine rotation  ttp over lumbar paraspinal muscles  Negative edis's test, negative gaenslen's negative SIJ loading bilaterally  Imaging    LUMBAR SPINE     INDICATION:   M54 50: Low back pain, unspecified  G89 29: Other chronic pain      COMPARISON:  None     VIEWS:  XR SPINE LUMBAR 2 OR 3 VIEWS INJURY        FINDINGS:     There are 5 non rib bearing lumbar vertebral bodies       There is no evidence of acute fracture or destructive osseous lesion  Note is made of rudimentary ribs at L1      Alignment is unremarkable       Facet joint degenerative arthropathy at L4-5 and L5-S1  Mild multilevel degenerative disc disease with endplate sclerosis and anterior osteophytes      The pedicles appear intact      Soft tissues are unremarkable      IMPRESSION:     No acute osseous abnormality        Mild multilevel degenerative changes

## 2022-10-14 NOTE — H&P (VIEW-ONLY)
Assessment  1  Lumbar spondylosis - Bilateral  -     Case request operating room: RHIZOTOMY LUMBAR L3, L4, L5 medial branch nerves; Standing  -     Case request operating room: RHIZOTOMY LUMBAR L3, L4, L5 medial branch nerves    Greater than 80% relief of pain with improved ability to participate with IADLs after bilateral L3, L4, L5 medial branch blocks #1 and #2  Previously reported the following symptomatology:     Axial low back pain described primarily by arthritic features  Aching, nagging, indolent, stabbing, throbbing features in axial low back without radicular components  5/5 strength bilaterally, negative SLR  Positive facet loading maneuvers in lumbar spine elicited pain, positive tenderness to palpation over lumbar paraspinal muscles  Findings correlate with prominent lumbar facet arthropathy seen throughout axial low back on x-ray  Currently he is neurologically intact without gait instability, saddle anesthesia or bowel/bladder abnormality  Risks, benefits alternative to medial branch blocks and subsequent radiofrequency ablation of successful thoroughly discussed with patient  Handouts provided questions answered to patient satisfaction  Plan  -Bilateral L3, L4, L5 medial branch nerve radiofrequency ablation; rtc 2 weeks post procedure (patient to call back to schedule)  -Meloxicam 7 5 mg b i d  prn pain prescribed  Educated to wean given elevated BP and feet swelling  Patient educated regarding bleeding risk of taking this medication as well as not taking any other nonsteroidal anti-inflammatory medications while taking this medication; counseled thoroughly regarding potential risk of Cardiovascular injury, Kidney injury, Gastrointestinal ulceration/bleeding   Patient voiced understanding  -continue physical therapy for lumbar facet arthropathy; Physician directed home exercise plan as per [facility] demonstrated and handouts provided that patient plans to participate with for 1 hour, twice a week for the next 6 weeks  There are risks associated with opioid medications, including dependence, addiction and tolerance  The patient understands and agrees to use these medications only as prescribed  Potential side effects of the medications include, but are not limited to, constipation, drowsiness, addiction, impaired judgment and risk of fatal overdose if not taken as prescribed  The patient was warned against driving while taking sedation medications or operating heavy machinery  The patient voiced understanding  Sharing medications is a felony  At this point in time, the patient is showing no signs of addiction, abuse, diversion or suicidal ideation  [place] Prescription Drug Monitoring Program report was reviewed and was appropriate      Complete risks and benefits including bleeding, infection, tissue reaction, nerve injury and allergic reaction were discussed  The approach was demonstrated using models and literature was provided  Verbal and written consent was obtained  My impressions and treatment recommendations were discussed in detail with the patient who verbalized understanding and had no further questions  Discharge instructions were provided  I personally saw and examined the patient and I agree with the above discussed plan of care  My impressions and treatment recommendations were discussed in detail with the patient who verbalized understanding and had no further questions  Discharge instructions were provided  I personally saw and examined the patient and I agree with the above discussed plan of care  No orders of the defined types were placed in this encounter  History of Present Illness    Greater than 80% relief of pain with improved ability to participate with IADLs after bilateral L3, L4, L5 medial branch blocks #1 and #2    Previously reported the following symptomatology:     Patrice Peoples is a 75 y o  male  With pmhx of lyme disease, urinary stones/frequency presenting with a past medical history of chronic low back pain described primarily as arthritic in nature  He describes 8/10 low back pain that is worse in the mornings and worse at the end of the day  The pain is characterized by achy, nagging, indolent, crampy, stabbing pain in his axial low back  The patient describes that the pain is worse with standing for long periods of time on hard surfaces as well as with walking  The patient is a very active individual and feels as though this pain compromises his participation with independent activities of daily living  The pain can be debilitating at times and contribute to significant disability, compromising overall activity and independent activities of daily living  He has tried physical therapy with good relief of symptoms  Medications the patient has tried in the past include nsaids  He describes no radicular symptoms and has good strength  He denies any weakness numbness or paresthesias  The patient denies any saddle anesthesia, gait abnormality or incontinence  I have personally reviewed and/or updated the patient's past medical history, past surgical history, family history, social history, current medications, allergies, and vital signs today  Review of Systems   Constitutional: Positive for activity change  HENT: Negative  Eyes: Negative  Respiratory: Negative  Cardiovascular: Negative  Gastrointestinal: Negative  Endocrine: Negative  Genitourinary: Negative  Musculoskeletal: Positive for arthralgias, back pain, gait problem and myalgias  Skin: Negative  Allergic/Immunologic: Negative  Neurological: Negative for weakness and numbness  Hematological: Negative  Psychiatric/Behavioral: Negative  All other systems reviewed and are negative        Patient Active Problem List   Diagnosis   • Calculus of kidney   • Benign prostatic hyperplasia without lower urinary tract symptoms   • Elevated PSA   • Chronic bilateral low back pain without sciatica   • Lumbar spondylosis - Bilateral       Past Medical History:   Diagnosis Date   • COVID    • Kidney stone    • Lyme disease    • Urinary frequency        Past Surgical History:   Procedure Laterality Date   • CARPAL TUNNEL RELEASE     • COLONOSCOPY     • CYSTOSCOPY W/ STONE MANIPULATION     • FL GUIDED NEEDLE PLAC BX/ASP/INJ  9/15/2022   • FL GUIDED NEEDLE PLAC BX/ASP/INJ  9/29/2022   • NERVE BLOCK Bilateral 9/15/2022    Procedure: BLOCK MEDIAL BRANCH L3, L4, L5;  Surgeon: Lilian Quach MD;  Location: [facility];  Service: Pain Management    • NERVE BLOCK Bilateral 9/29/2022    Procedure: BLOCK MEDIAL BRANCH L3, L4, L5 #2;  Surgeon: Lilian Quach MD;  Location: [facility];  Service: Pain Management        Family History   Problem Relation Age of Onset   • Diabetes Father    • Heart disease Father    • Diabetes Brother        Social History     Occupational History   • Occupation:   Tobacco Use   • Smoking status: Never Smoker   • Smokeless tobacco: Never Used   Vaping Use   • Vaping Use: Never used   Substance and Sexual Activity   • Alcohol use: Yes     Alcohol/week: 2 0 - 4 0 standard drinks     Types: 2 - 4 Cans of beer per week   • Drug use: No   • Sexual activity: Not on file       Current Outpatient Medications on File Prior to Visit   Medication Sig   • Ascorbic Acid (VITAMIN C PO) 500 mg   • cholecalciferol (VITAMIN D3) 1,000 units tablet Take 1,000 Units by mouth daily   • hydrochlorothiazide (HYDRODIURIL) 25 mg tablet 25 mg   • Saw Palmetto, Serenoa repens, (SAW PALMETTO PO) Take by mouth   • Zinc Sulfate (ZINC 15 PO) Take by mouth     No current facility-administered medications on file prior to visit  Allergies   Allergen Reactions   • Tamsulosin      Joint pain with alfuzosin also           Physical Exam    /68   Temp (!) 97 1 °F (36 2 °C)   Resp 20   Ht 5' 11 5" (1 816 m)   Wt 109 kg (240 lb 9 6 oz)   BMI 33 09 kg/m²     Constitutional: normal, well developed, well nourished, alert, in no distress and non-toxic and no overt pain behavior  and obese  Eyes: anicteric  HEENT: grossly intact  Neck: supple, symmetric, trachea midline and no masses   Pulmonary:even and unlabored  Cardiovascular:No edema or pitting edema present  Skin:Normal without rashes or lesions and well hydrated  Psychiatric:Mood and affect appropriate  Neurologic:Cranial Nerves II-XII grossly intact Sensation grossly intact; no clonus negative barry's  Reflexes 2+ and brisk  SLR negative bilaterally  Spurling's maneuver negative bilaterally  Musculoskeletal:normal gait  5/5 strength bilaterally with AROM in all extremities  Normal heel toe and tip toe walking  Significant pain with lumbar facet loading bilaterally and with lateral spine rotation  ttp over lumbar paraspinal muscles  Negative edis's test, negative gaenslen's negative SIJ loading bilaterally  Imaging    LUMBAR SPINE     INDICATION:   M54 50: Low back pain, unspecified  G89 29: Other chronic pain      COMPARISON:  None     VIEWS:  [facility] SP[facility] LUMBAR 2 OR 3 VIEWS INJURY        FINDINGS:     There are 5 non rib bearing lumbar vertebral bodies       There is no evidence of acute fracture or destructive osseous lesion  Note is made of rudimentary ribs at L1      Alignment is unremarkable       Facet joint degenerative arthropathy at L4-5 and L5-S1  Mild multilevel degenerative disc disease with endplate sclerosis and anterior osteophytes      The pedicles appear intact      Soft tissues are unremarkable      IMPRESSION:     No acute osseous abnormality        Mild multilevel degenerative changes

## 2022-10-25 ENCOUNTER — HOSPITAL ENCOUNTER (OUTPATIENT)
Facility: HOSPITAL | Age: 67
Setting detail: OUTPATIENT SURGERY
Discharge: HOME | End: 2022-10-25
Attending: ANESTHESIOLOGY | Admitting: ANESTHESIOLOGY
Payer: COMMERCIAL

## 2022-10-25 ENCOUNTER — APPOINTMENT (OUTPATIENT)
Dept: RADIOLOGY | Facility: HOSPITAL | Age: 67
End: 2022-10-25
Payer: COMMERCIAL

## 2022-10-25 VITALS
HEIGHT: 72 IN | SYSTOLIC BLOOD PRESSURE: 141 MMHG | WEIGHT: 240 LBS | TEMPERATURE: 97.7 F | DIASTOLIC BLOOD PRESSURE: 80 MMHG | OXYGEN SATURATION: 96 % | BODY MASS INDEX: 32.51 KG/M2 | RESPIRATION RATE: 18 BRPM | HEART RATE: 65 BPM

## 2022-10-25 PROCEDURE — 77003 FLUOROGUIDE FOR SPINE INJECT: CPT

## 2022-10-25 PROCEDURE — 64636 DESTROY L/S FACET JNT ADDL: CPT | Performed by: ANESTHESIOLOGY

## 2022-10-25 PROCEDURE — 64635 DESTROY LUMB/SAC FACET JNT: CPT | Performed by: ANESTHESIOLOGY

## 2022-10-25 RX ORDER — OXYCODONE HYDROCHLORIDE AND ACETAMINOPHEN 5; 325 MG/1; MG/1
1 TABLET ORAL ONCE
Status: COMPLETED | OUTPATIENT
Start: 2022-10-25 | End: 2022-10-25

## 2022-10-25 RX ORDER — METHYLPREDNISOLONE ACETATE 80 MG/ML
INJECTION, SUSPENSION INTRA-ARTICULAR; INTRALESIONAL; INTRAMUSCULAR; SOFT TISSUE AS NEEDED
Status: DISCONTINUED | OUTPATIENT
Start: 2022-10-25 | End: 2022-10-25 | Stop reason: HOSPADM

## 2022-10-25 RX ORDER — LIDOCAINE HYDROCHLORIDE 20 MG/ML
INJECTION, SOLUTION EPIDURAL; INFILTRATION; INTRACAUDAL; PERINEURAL AS NEEDED
Status: DISCONTINUED | OUTPATIENT
Start: 2022-10-25 | End: 2022-10-25 | Stop reason: HOSPADM

## 2022-10-25 RX ORDER — BUPIVACAINE HYDROCHLORIDE 2.5 MG/ML
INJECTION, SOLUTION EPIDURAL; INFILTRATION; INTRACAUDAL AS NEEDED
Status: DISCONTINUED | OUTPATIENT
Start: 2022-10-25 | End: 2022-10-25 | Stop reason: HOSPADM

## 2022-10-25 RX ORDER — LIDOCAINE HYDROCHLORIDE 10 MG/ML
INJECTION, SOLUTION EPIDURAL; INFILTRATION; INTRACAUDAL; PERINEURAL AS NEEDED
Status: DISCONTINUED | OUTPATIENT
Start: 2022-10-25 | End: 2022-10-25 | Stop reason: HOSPADM

## 2022-10-25 RX ORDER — ALPRAZOLAM 0.5 MG/1
0.5 TABLET ORAL ONCE
Status: COMPLETED | OUTPATIENT
Start: 2022-10-25 | End: 2022-10-25

## 2022-10-25 RX ADMIN — ALPRAZOLAM 0.5 MG: 0.5 TABLET ORAL at 10:16

## 2022-10-25 RX ADMIN — OXYCODONE HYDROCHLORIDE AND ACETAMINOPHEN 1 TABLET: 5; 325 TABLET ORAL at 10:16

## 2022-10-25 NOTE — INTERVAL H&P NOTE
H&P reviewed  After examining the patient I find no changes in the patients condition since the H&P had been written      Vitals:    10/25/22 1011   BP: 135/77   Pulse: 86   Resp: 20   Temp: 98 3 °F (36 8 °C)   SpO2: 96%

## 2022-10-25 NOTE — PROCEDURES
Pre-procedure Diagnosis: Lumbar facet arthropathy  Post-procedure Diagnosis: Lumbar facet arthropathy  Operation Title(s):  1  Radiofrequency ablation of [BILATERAL] L3, L4, L5 medial branch nerves      2  Intraoperative fluoroscopy  Attending Surgeon:   Mick Heart MD  Anesthesia:   Local    Indications: The patient is a 75y o  year-old male with a diagnosis of lumbar facet arthropathy  The patient's history and physical exam were reviewed  The patient has previously undergone diagnostic and confirmatory lumbar medial branch blocks  Fluoroscopy is being used for the precise placement of the needles at the lumbar medial branch nerves  The risks, benefits and alternatives to the procedure were discussed, and all questions were answered to the patient's satisfaction  The patient agreed to proceed, and written informed consent was obtained  Procedure in Detail: The patient was brought into the procedure room and placed in the prone position on the fluoroscopy table  The low back and upper buttock were prepped with chloraprep times two and draped in a sterile manner  AP fluoroscopy was used to identify the lumbar levels on the [LEFT] side  The fluoroscope beam was then obliqued to better visualize the junction of the superior articular process and transverse process on the [LEFT] side and then tilted caudally about 25 degrees  An 18-gauge, 100mm length, 10mm curved active tip radiofrequency probe was advanced toward the targeted points until bone was contacted  Multiple fluoroscopic views were made to ensure placement of the needle tip at the appropriate location of the medial branch nerve  Motor stimulation was performed at 2 Hz and 1 2 volts generating a twitch in the paraspinal muscles with no motor activity in the lower extremities  Next, AP fluoroscopy was used to identify the L5-S1 level  The fluoroscope been was tilted cephalad to visualize the sacral ala   The fluoroscope beam was then tilted about 45 degrees from that point and the skin and subcutaneous tissues in these identified areas were anesthetized with 1% lidocaine  An 18-gauge, 100mm length, 10mm curved active tip radiofrequency probe was advanced toward the targeted points until bone was contacted  Motor stimulation was performed at 2 Hz and 1 2 volts generating a twitch in the paraspinal muscles with no motor activity in the lower extremities  0 5ml of 2% lidocaine was injected prior to lesioning, which was performed for 90 seconds at 80 degrees centigrade  The lesion was repeated once more after slight repositioning of the needles on an oblique view  Once the lesions were complete, 1ml of a solution consisting of 5mL 0 25% bupivacaine and 1 mL Depo-medrol (80mg/mL) was injected through each needle and then removed with a 1% lidocaine flush  The patient's back was cleaned, and bandages were placed at the needle insertion site  The same procedure was repeated at the lumbar levels on the [RIGHT] side    Disposition: The patient tolerated the procedure well and there were no apparent complications  Vital signs remained stable throughout the procedure  The patient was taken to the recovery area where written discharge instructions for the procedure were given      Estimated Blood Loss: None  Specimens Obtained: N/A

## 2022-10-25 NOTE — OP NOTE
OPERATIVE REPORT  PATIENT NAME: William Jc    :  1955  MRN: [id number]  Pt Location:  GI ROOM 01    SURGERY DATE: 10/25/2022    Surgeon(s) and Role: Laya Garcia MD - Primary    Preop Diagnosis:  Lumbar spondylosis [M47 816]    Post-Op Diagnosis Codes:     * Lumbar spondylosis [M47 816]    Procedure(s) (LRB):  RHIZOTOMY LUMBAR L3, L4, L5 medial branch nerves (Bilateral)    Specimen(s):  * No specimens in log *    Estimated Blood Loss:   Minimal    Drains:  * No LDAs found *    Anesthesia Type:   Local    Operative Indications:  Lumbar spondylosis [M47 816]    Operative Findings:  Bilateral L3, L4, L5 medial branch nerve regions identified under fluoroscopic guidance   Appropriate motor testing performed prior to radiofrequency lesioning of medial branch nerve regions    Complications:   None    Procedure and Technique:  Please see detailed procedure note    I was present for the entire procedure    Patient Disposition:  PACU     SIGNATURE: Alexander Bueno MD  DATE: 2022  TIME: 11:23 AM

## 2022-10-25 NOTE — DISCHARGE INSTRUCTIONS
YOUR 2 WEEK FOLLOW UP HAS BEEN SCHEDULED; IF YOU WISH TO CHANGE THE FOLLOW UP, PLEASE CALL THE [facility] [place]: [phone]  Lumbar Radiofrequency Ablation   WHAT YOU NEED TO KNOW:   Lumbar radiofrequency ablation (RFA) is a procedure used to treat facet joint pain in your lower back  Facet joints are found at the back of each vertebra  A needle electrode is used to send electrical currents to the nerves in your facet joint  The electrical currents create heat that damages the nerve so it cannot send pain signals  DISCHARGE INSTRUCTIONS:   Seek care immediately if:   You cannot move your leg  You cannot control your urine or bowel movements  You have severe pain in your lower back  Contact your healthcare provider if:   You have leg weakness  You develop new symptoms  You have questions or concerns about your condition or care  Medicines:   Pain medicine  may be given  Ask how to take this medicine safely  Take your medicine as directed  Contact your healthcare provider if you think your medicine is not helping or if you have side effects  Tell him or her if you are allergic to any medicine  Keep a list of the medicines, vitamins, and herbs you take  Include the amounts, and when and why you take them  Bring the list or the pill bottles to follow-up visits  Carry your medicine list with you in case of an emergency  Follow up with your healthcare provider as directed:  Write down your questions so you remember to ask them during your visits  Activity:  Do not drive a car or operate machinery within 24 hours after your procedure  Ask your healthcare provider about any other activities you should avoid  © Copyright [employer] 2022 Information is for End User's use only and may not be sold, redistributed or otherwise used for commercial purposes   All illustrations and images included in CareNotes® are the copyrighted property of [employer]  or [facility]  The above information is an  only  It is not intended as medical advice for individual conditions or treatments  Talk to your doctor, nurse or pharmacist before following any medical regimen to see if it is safe and effective for you

## 2022-10-26 ENCOUNTER — TELEPHONE (OUTPATIENT)
Dept: RADIOLOGY | Facility: CLINIC | Age: 67
End: 2022-10-26

## 2022-10-26 NOTE — TELEPHONE ENCOUNTER
S/w pt who states that his back feels like he has run 10 miles even though he has not, described as nagging but does feel that he has more movement since procedure  Pt will try Meloxicam today for pain  Pt advised that if no contraindications, he can try tylenol 1000 mg q8 hrs with no more that 3000 mg /24 hrs and try ice and or heat 20 mins off and on  Pt denies fever sunburn like sensation or signs of infection  Pt will take Band-Aids off later and cb if any concerns  Pt aware it may take 2 weeks to notice a difference and 4-6 weeks for the full effect

## 2022-11-23 ENCOUNTER — OFFICE VISIT (OUTPATIENT)
Dept: PAIN MEDICINE | Facility: CLINIC | Age: 67
End: 2022-11-23

## 2022-11-23 VITALS
HEIGHT: 72 IN | BODY MASS INDEX: 33.05 KG/M2 | OXYGEN SATURATION: 95 % | SYSTOLIC BLOOD PRESSURE: 144 MMHG | TEMPERATURE: 97.4 F | WEIGHT: 244 LBS | HEART RATE: 75 BPM | DIASTOLIC BLOOD PRESSURE: 78 MMHG

## 2022-11-23 DIAGNOSIS — M54.16 LUMBAR RADICULOPATHY: Primary | ICD-10-CM

## 2022-11-23 RX ORDER — LIDOCAINE HYDROCHLORIDE 10 MG/ML
5 INJECTION, SOLUTION EPIDURAL; INFILTRATION; INTRACAUDAL; PERINEURAL ONCE
OUTPATIENT
Start: 2022-11-23 | End: 2022-11-23

## 2022-11-23 RX ORDER — 0.9 % SODIUM CHLORIDE 0.9 %
3 VIAL (ML) INJECTION ONCE
OUTPATIENT
Start: 2022-11-23 | End: 2022-11-23

## 2022-11-23 RX ORDER — METHYLPREDNISOLONE ACETATE 80 MG/ML
80 INJECTION, SUSPENSION INTRA-ARTICULAR; INTRALESIONAL; INTRAMUSCULAR; PARENTERAL; SOFT TISSUE ONCE
OUTPATIENT
Start: 2022-11-23 | End: 2022-11-23

## 2022-11-23 RX ORDER — GABAPENTIN 300 MG/1
300 CAPSULE ORAL 3 TIMES DAILY
Qty: 90 CAPSULE | Refills: 0 | Status: SHIPPED | OUTPATIENT
Start: 2022-11-23

## 2022-11-23 NOTE — PATIENT INSTRUCTIONS
Core Strengthening Exercises   WHAT YOU NEED TO KNOW:   Your core includes the muscles of your lower back, hip, pelvis, and abdomen  Core strengthening exercises help heal and strengthen these muscles  This helps prevent another injury, and keeps your pelvis, spine, and hips in the correct position  DISCHARGE INSTRUCTIONS:   Contact your healthcare provider if:   You have sharp or worsening pain during exercise or at rest     You have questions or concerns about your shoulder exercises  Safety tips:  Talk to your healthcare provider before you start an exercise program  A physical therapist can teach you how to do core strengthening exercises safely  Do the exercises on a mat or firm surface  A firm surface will support your spine and prevent low back pain  Do not do these exercises on a bed  Move slowly and smoothly  Avoid fast or jerky motions  Stop if you feel pain  Core exercises should not be painful  Stop if you feel pain  Breathe normally during core exercises  Do not hold your breath  This may cause an increase in blood pressure and prevent muscle strengthening  Your healthcare provider will tell you when to inhale and exhale during the exercise  Begin all of your exercises with abdominal bracing  Abdominal bracing helps warm up your core muscles  You can also practice abdominal bracing throughout the day  Lie on your back with your knees bent and feet flat on the floor  Place your arms in a relaxed position beside your body  Tighten your abdominal muscles  Pull your belly button in and up toward your spine  Hold for 5 seconds  Relax your muscles  Repeat 10 times  Core strengthening exercises: Your healthcare provider will tell you how often to do these exercises  The provider will also tell you how many repetitions of each exercise you should do  Hold each exercise for 5 seconds or as directed  As you get stronger, increase your hold to 10 to 15 seconds   You can do some of these exercises on a stability ball, or with a weight  Ask your healthcare provider how to use a stability ball or weight for these exercises:  Bridging:  Lie on your back with your knees bent and feet flat on the floor  Rest your arms at your side  Tighten your buttocks, and then lift your hips 1 inch off the floor  Hold for 5 seconds  When you can do this exercise without pain for 10 seconds, increase the distance you lift your hips  A good goal is to be able to lift your hips so that your shoulders, hips, and knees are in a straight line  Dead bug:  Lie on your back with your knees bent and feet flat on the floor  Place your arms in a relaxed position beside your body  Begin with abdominal bracing  Next, raise one leg, keeping your knee bent  Hold for 5 seconds  Repeat with the other leg  When you can do this exercise without pain for 10 to 15 seconds, you may raise one straight leg and hold  Repeat with the other leg  Quadruped:  Place your hands and knees on the floor  Keep your wrists directly below your shoulders and your knees directly below your hips  Pull your belly button in toward your spine  Do not flatten or arch your back  Tighten your abdominal muscles below your belly button  Hold for 5 seconds  When you can do this exercise without pain for 10 to 15 seconds, you may extend one arm and hold  Repeat on the other side  Side bridge exercises:      Standing side bridge:  Stand next to a wall and extend one arm toward the wall  Place your palm flat on the wall with your fingers pointing upward  Begin with abdominal bracing  Next, without moving your feet, slowly bend your arm to 90 degrees  Hold for 5 seconds  Repeat on the other side  When you can do this exercise without pain for 10 to 15 seconds, you may do the bent leg side bridge on the floor  Bent leg side bridge:  Lie on one side with your legs, hips, and shoulders in a straight line   Prop yourself up onto your forearm so your elbow is directly below your shoulder  Bend your knees back to 90 degrees  Begin with abdominal bracing  Next, lift your hips and balance yourself on your forearm and knees  Hold for 5 seconds  Repeat on the other side  When you can do this exercise without pain for 10 to 15 seconds, you may do the straight leg side bridge on the floor  Straight leg side bridge:  Lie on one side with your legs, hips, and shoulders in a straight line  Prop yourself up onto your forearm so your elbow is directly below your shoulder  Begin with abdominal bracing  Lift your hips off the floor and balance yourself on your forearm and the outside of your flexed foot  Do not let your ankle bend sideways  Hold for 5 seconds  Repeat on the other side  When you can do this exercise without pain for 10 to 15 seconds, ask your healthcare provider for more advanced exercises  Superman:  Lie on your stomach  Extend your arms forward on the floor  Tighten your abdominal muscles and lift your right hand and left leg off the floor  Hold this position  Slowly return to the starting position  Tighten your abdominal muscles and lift your left hand and right leg off the floor  Hold this position  Slowly return to the starting position  Clam:  Lie on your side with your knees bent  Put your bottom arm under your head to keep your neck in line  Put your top hand on your hip to keep your pelvis from moving  Put your heels together, and keep them together during this exercise  Slowly raise your top knee toward the ceiling  Then lower your leg so your knees are together  Repeat this exercise 10 times  Then switch sides and do the exercise 10 times with the other leg  Curl up:  Lie on your back with your knees bent and feet flat on the floor  Place your hands, palms down, underneath your lower back  Next, with your elbows on the floor, lift your shoulders and chest 2 to 3 inches off the floor   Keep your head in line with your shoulders  Hold this position  Slowly return to the starting position  Straight leg raises:  Lie on your back with one leg straight  Bend the other knee and place your foot flat on the floor  Tighten your abdominal muscles  Keep your leg straight and slowly lift it straight up 6 to 12 inches off the floor  Hold this position  Lower your leg slowly  Do as many repetitions as directed on this side  Repeat with the other leg  Plank:  Lie on your stomach  Bend your elbows and place your forearms flat on the floor  Lift your chest, stomach, and knees off the floor  Make sure your elbows are below your shoulders  Your body should be in a straight line  Do not let your hips or lower back sink to the ground  Squeeze your abdominal muscles together and hold for 15 seconds  To make this exercise harder, hold for 30 seconds or lift 1 leg at a time  Bicycles:  Lie on your back  Bend both knees and bring them toward your chest  Your calves should be parallel to the floor  Place the palms of your hands on the back of your head  Straighten your right leg and keep it lifted 2 inches off the floor  Raise your head and shoulders off the floor and twist towards your left  Keep your head and shoulders lifted  Bend your right knee while you straighten your left leg  Keep your left leg 2 inches off the floor  Twist your head and chest towards the left leg  Continue to straighten 1 leg at a time and twist        Follow up with your doctor as directed:  Write down your questions so you remember to ask them during your visits  © Copyright [employer] 2022 Information is for End User's use only and may not be sold, redistributed or otherwise used for commercial purposes  All illustrations and images included in CareNotes® are the copyrighted property of [employer]  or [facility] Marlon Dooley   The above information is an  only   It is not intended as medical advice for individual conditions or treatments  Talk to your doctor, nurse or pharmacist before following any medical regimen to see if it is safe and effective for you

## 2022-11-23 NOTE — PROGRESS NOTES
Assessment  1  Lumbar radiculopathy  -     Case request operating room: BLOCK / INJECTION EPIDURAL STEROID LUMBAR L4-L5 or alternate level; Standing  -     Case request operating room: BLOCK / INJECTION EPIDURAL STEROID LUMBAR L4-L5 or alternate level  -     gabapentin (NEURONTIN) 300 mg capsule; Take 1 capsule (300 mg total) by mouth 3 (three) times a day    Greater than 80% relief of pain with improved ability to participate with IADLs after bilateral L3, L4, L5 medial branch blocks #1 and #2   One month s/p ablation without significant relief  sxs could be from proximal radicular features of L4 radiculopathy; will plan for ROEL and start patient on gabapentin for pain control  Denies bowel/bladder issues, saddle anesthesia or gait insability  Previously reported the following symptomatology:     Axial low back pain described primarily by arthritic features  Aching, nagging, indolent, stabbing, throbbing features in axial low back without radicular components  5/5 strength bilaterally, negative SLR  Positive facet loading maneuvers in lumbar spine elicited pain, positive tenderness to palpation over lumbar paraspinal muscles  Findings correlate with prominent lumbar facet arthropathy seen throughout axial low back on x-ray  Currently he is neurologically intact without gait instability, saddle anesthesia or bowel/bladder abnormality  Risks, benefits alternative to medial branch blocks and subsequent radiofrequency ablation of successful thoroughly discussed with patient  Handouts provided questions answered to patient satisfaction  Plan  -L4-L5 ILESI; rtc 2 weeks post procedure (patient to call back to schedule)  -gabapentin 300 mg t i d  Ordered for patient; counseled regarding sedative effects of taking this medication and provided up titration calendar    Counseled not to take medication while driving or operating heavy machinery/using stairs  -continue physical therapy for lumbar radiculopathy; Physician directed home exercise plan as per [facility] demonstrated and handouts provided that patient plans to participate with for 1 hour, twice a week for the next 6 weeks  There are risks associated with opioid medications, including dependence, addiction and tolerance  The patient understands and agrees to use these medications only as prescribed  Potential side effects of the medications include, but are not limited to, constipation, drowsiness, addiction, impaired judgment and risk of fatal overdose if not taken as prescribed  The patient was warned against driving while taking sedation medications or operating heavy machinery  The patient voiced understanding  Sharing medications is a felony  At this point in time, the patient is showing no signs of addiction, abuse, diversion or suicidal ideation  [place] Prescription Drug Monitoring Program report was reviewed and was appropriate      Complete risks and benefits including bleeding, infection, tissue reaction, nerve injury and allergic reaction were discussed  The approach was demonstrated using models and literature was provided  Verbal and written consent was obtained  My impressions and treatment recommendations were discussed in detail with the patient who verbalized understanding and had no further questions  Discharge instructions were provided  I personally saw and examined the patient and I agree with the above discussed plan of care  My impressions and treatment recommendations were discussed in detail with the patient who verbalized understanding and had no further questions  Discharge instructions were provided  I personally saw and examined the patient and I agree with the above discussed plan of care      New Medications Ordered This Visit   Medications   • gabapentin (NEURONTIN) 300 mg capsule     Sig: Take 1 capsule (300 mg total) by mouth 3 (three) times a day     Dispense:  90 capsule     Refill:  0       History of Present Illness    Greater than 80% relief of pain with improved ability to participate with IADLs after bilateral L3, L4, L5 medial branch blocks #1 and #2   One month s/p ablation without significant relief  sxs could be from proximal radicular features of L4 radiculopathy; will plan for ROEL and start patient on gabapentin for pain control  Denies bowel/bladder issues, saddle anesthesia or gait insability  Previously reported the following symptomatology:     Vinh Peters is a 75 y o  male  With pmhx of lyme disease, urinary stones/frequency presenting with a past medical history of chronic low back pain described primarily as arthritic in nature  He describes 8/10 low back pain that is worse in the mornings and worse at the end of the day  The pain is characterized by achy, nagging, indolent, crampy, stabbing pain in his axial low back  The patient describes that the pain is worse with standing for long periods of time on hard surfaces as well as with walking  The patient is a very active individual and feels as though this pain compromises his participation with independent activities of daily living  The pain can be debilitating at times and contribute to significant disability, compromising overall activity and independent activities of daily living  He has tried physical therapy with good relief of symptoms  Medications the patient has tried in the past include nsaids  He describes no radicular symptoms and has good strength  He denies any weakness numbness or paresthesias  The patient denies any saddle anesthesia, gait abnormality or incontinence  I have personally reviewed and/or updated the patient's past medical history, past surgical history, family history, social history, current medications, allergies, and vital signs today  Review of Systems   Constitutional: Positive for activity change  HENT: Negative  Eyes: Negative  Respiratory: Negative  Cardiovascular: Negative  Gastrointestinal: Negative  Endocrine: Negative  Genitourinary: Negative  Musculoskeletal: Positive for arthralgias, back pain, gait problem and myalgias  Skin: Negative  Allergic/Immunologic: Negative  Neurological: Negative for weakness and numbness  Hematological: Negative  Psychiatric/Behavioral: Negative  All other systems reviewed and are negative  Patient Active Problem List   Diagnosis   • Calculus of kidney   • Benign prostatic hyperplasia without lower urinary tract symptoms   • Elevated PSA   • Chronic bilateral low back pain without sciatica   • Lumbar spondylosis - Bilateral   • Lumbar radiculopathy       Past Medical History:   Diagnosis Date   • COVID    • Kidney stone    • Lyme disease    • Urinary frequency        Past Surgical History:   Procedure Laterality Date   • CARPAL TUNNEL RELEASE     • COLONOSCOPY     • CYSTOSCOPY W/ STONE MANIPULATION     • FL GUIDED NEEDLE PLAC BX/ASP/INJ  9/15/2022   • FL GUIDED NEEDLE PLAC BX/ASP/INJ  9/29/2022   • NERVE BLOCK Bilateral 9/15/2022    Procedure: BLOCK MEDIAL BRANCH L3, L4, L5;  Surgeon: Sravan Beckford MD;  Location: [facility];  Service: Pain Management    • NERVE BLOCK Bilateral 9/29/2022    Procedure: BLOCK MEDIAL BRANCH L3, L4, L5 #2;  Surgeon: Sravan Beckford MD;  Location: [facility];  Service: Pain Management    • RHIZOTOMY Bilateral 10/25/2022    Procedure: RHIZOTOMY LUMBAR L3, L4, L5 medial branch nerves;  Surgeon: Sravan Beckford MD;  Location: [facility];  Service: Pain Management        Family History   Problem Relation Age of Onset   • Diabetes Father    • Heart disease Father    • Diabetes Brother        Social History     Occupational History   • Occupation:   Tobacco Use   • Smoking status: Never   • Smokeless tobacco: Never   Vaping Use   • Vaping Use: Never used   Substance and Sexual Activity   • Alcohol use:  Yes     Alcohol/week: 2 0 - 4 0 standard drinks     Types: 2 - 4 Cans of beer per week   • Drug use: No   • Sexual activity: Not on file       Current Outpatient Medications on File Prior to Visit   Medication Sig   • Ascorbic Acid (VITAMIN C PO) 500 mg   • cholecalciferol (VITAMIN D3) 1,000 units tablet Take 1,000 Units by mouth daily   • Saw Palmetto, Serenoa repens, (SAW PALMETTO PO) Take by mouth   • Zinc Sulfate (ZINC 15 PO) Take by mouth   • hydrochlorothiazide (HYDRODIURIL) 25 mg tablet 25 mg (Patient not taking: Reported on 11/23/2022)     No current facility-administered medications on file prior to visit  Allergies   Allergen Reactions   • Tamsulosin      Joint pain with alfuzosin also  Physical Exam    /78 (BP Location: Right arm, Patient Position: Sitting, Cuff Size: Standard)   Pulse 75   Temp (!) 97 4 °F (36 3 °C)   Ht 5' 11 5" (1 816 m)   Wt 111 kg (244 lb)   SpO2 95%   BMI 33 56 kg/m²     Constitutional: normal, well developed, well nourished, alert, in no distress and non-toxic and no overt pain behavior  and obese  Eyes: anicteric  HEENT: grossly intact  Neck: supple, symmetric, trachea midline and no masses   Pulmonary:even and unlabored  Cardiovascular:No edema or pitting edema present  Skin:Normal without rashes or lesions and well hydrated  Psychiatric:Mood and affect appropriate  Neurologic:Cranial Nerves II-XII grossly intact Sensation grossly intact; no clonus negative barry's  Reflexes 2+ and brisk  SLR negative bilaterally  Spurling's maneuver negative bilaterally  Musculoskeletal:normal gait  5/5 strength bilaterally with AROM in all extremities  Normal heel toe and tip toe walking  Significant pain with lumbar facet loading bilaterally and with lateral spine rotation  ttp over lumbar paraspinal muscles  Negative edis's test, negative gaenslen's negative SIJ loading bilaterally  Imaging    LUMBAR SPINE     INDICATION:   M54 50: Low back pain, unspecified  G89 29:  Other chronic pain      COMPARISON:  None     VIEWS:  XR SPINE LUMBAR 2 OR 3 VIEWS INJURY        FINDINGS:     There are 5 non rib bearing lumbar vertebral bodies       There is no evidence of acute fracture or destructive osseous lesion  Note is made of rudimentary ribs at L1      Alignment is unremarkable       Facet joint degenerative arthropathy at L4-5 and L5-S1  Mild multilevel degenerative disc disease with endplate sclerosis and anterior osteophytes      The pedicles appear intact      Soft tissues are unremarkable      IMPRESSION:     No acute osseous abnormality        Mild multilevel degenerative changes

## 2022-11-23 NOTE — H&P (VIEW-ONLY)
Assessment  1  Lumbar radiculopathy  -     Case request operating room: BLOCK / INJECTION EPIDURAL STEROID LUMBAR L4-L5 or alternate level; Standing  -     Case request operating room: BLOCK / INJECTION EPIDURAL STEROID LUMBAR L4-L5 or alternate level  -     gabapentin (NEURONTIN) 300 mg capsule; Take 1 capsule (300 mg total) by mouth 3 (three) times a day    Greater than 80% relief of pain with improved ability to participate with IADLs after bilateral L3, L4, L5 medial branch blocks #1 and #2   One month s/p ablation without significant relief  sxs could be from proximal radicular features of L4 radiculopathy; will plan for ROEL and start patient on gabapentin for pain control  Denies bowel/bladder issues, saddle anesthesia or gait insability  Previously reported the following symptomatology:     Axial low back pain described primarily by arthritic features  Aching, nagging, indolent, stabbing, throbbing features in axial low back without radicular components  5/5 strength bilaterally, negative SLR  Positive facet loading maneuvers in lumbar spine elicited pain, positive tenderness to palpation over lumbar paraspinal muscles  Findings correlate with prominent lumbar facet arthropathy seen throughout axial low back on x-ray  Currently he is neurologically intact without gait instability, saddle anesthesia or bowel/bladder abnormality  Risks, benefits alternative to medial branch blocks and subsequent radiofrequency ablation of successful thoroughly discussed with patient  Handouts provided questions answered to patient satisfaction  Plan  -L4-L5 ILESI; rtc 2 weeks post procedure (patient to call back to schedule)  -gabapentin 300 mg t i d  Ordered for patient; counseled regarding sedative effects of taking this medication and provided up titration calendar    Counseled not to take medication while driving or operating heavy machinery/using stairs  -continue physical therapy for lumbar radiculopathy; Physician directed home exercise plan as per [facility] demonstrated and handouts provided that patient plans to participate with for 1 hour, twice a week for the next 6 weeks  There are risks associated with opioid medications, including dependence, addiction and tolerance  The patient understands and agrees to use these medications only as prescribed  Potential side effects of the medications include, but are not limited to, constipation, drowsiness, addiction, impaired judgment and risk of fatal overdose if not taken as prescribed  The patient was warned against driving while taking sedation medications or operating heavy machinery  The patient voiced understanding  Sharing medications is a felony  At this point in time, the patient is showing no signs of addiction, abuse, diversion or suicidal ideation  [place] Prescription Drug Monitoring Program report was reviewed and was appropriate      Complete risks and benefits including bleeding, infection, tissue reaction, nerve injury and allergic reaction were discussed  The approach was demonstrated using models and literature was provided  Verbal and written consent was obtained  My impressions and treatment recommendations were discussed in detail with the patient who verbalized understanding and had no further questions  Discharge instructions were provided  I personally saw and examined the patient and I agree with the above discussed plan of care  My impressions and treatment recommendations were discussed in detail with the patient who verbalized understanding and had no further questions  Discharge instructions were provided  I personally saw and examined the patient and I agree with the above discussed plan of care      New Medications Ordered This Visit   Medications   • gabapentin (NEURONTIN) 300 mg capsule     Sig: Take 1 capsule (300 mg total) by mouth 3 (three) times a day     Dispense:  90 capsule     Refill:  0       History of Present Illness    Greater than 80% relief of pain with improved ability to participate with IADLs after bilateral L3, L4, L5 medial branch blocks #1 and #2   One month s/p ablation without significant relief  sxs could be from proximal radicular features of L4 radiculopathy; will plan for ROEL and start patient on gabapentin for pain control  Denies bowel/bladder issues, saddle anesthesia or gait insability  Previously reported the following symptomatology:     Shania García is a 75 y o  male  With pmhx of lyme disease, urinary stones/frequency presenting with a past medical history of chronic low back pain described primarily as arthritic in nature  He describes 8/10 low back pain that is worse in the mornings and worse at the end of the day  The pain is characterized by achy, nagging, indolent, crampy, stabbing pain in his axial low back  The patient describes that the pain is worse with standing for long periods of time on hard surfaces as well as with walking  The patient is a very active individual and feels as though this pain compromises his participation with independent activities of daily living  The pain can be debilitating at times and contribute to significant disability, compromising overall activity and independent activities of daily living  He has tried physical therapy with good relief of symptoms  Medications the patient has tried in the past include nsaids  He describes no radicular symptoms and has good strength  He denies any weakness numbness or paresthesias  The patient denies any saddle anesthesia, gait abnormality or incontinence  I have personally reviewed and/or updated the patient's past medical history, past surgical history, family history, social history, current medications, allergies, and vital signs today  Review of Systems   Constitutional: Positive for activity change  HENT: Negative  Eyes: Negative  Respiratory: Negative  Cardiovascular: Negative  Gastrointestinal: Negative  Endocrine: Negative  Genitourinary: Negative  Musculoskeletal: Positive for arthralgias, back pain, gait problem and myalgias  Skin: Negative  Allergic/Immunologic: Negative  Neurological: Negative for weakness and numbness  Hematological: Negative  Psychiatric/Behavioral: Negative  All other systems reviewed and are negative  Patient Active Problem List   Diagnosis   • Calculus of kidney   • Benign prostatic hyperplasia without lower urinary tract symptoms   • Elevated PSA   • Chronic bilateral low back pain without sciatica   • Lumbar spondylosis - Bilateral   • Lumbar radiculopathy       Past Medical History:   Diagnosis Date   • COVID    • Kidney stone    • Lyme disease    • Urinary frequency        Past Surgical History:   Procedure Laterality Date   • CARPAL TUNNEL RELEASE     • COLONOSCOPY     • CYSTOSCOPY W/ STONE MANIPULATION     • FL GUIDED NEEDLE PLAC BX/ASP/INJ  9/15/2022   • FL GUIDED NEEDLE PLAC BX/ASP/INJ  9/29/2022   • NERVE BLOCK Bilateral 9/15/2022    Procedure: BLOCK MEDIAL BRANCH L3, L4, L5;  Surgeon: Natalee Carter MD;  Location: [facility];  Service: Pain Management    • NERVE BLOCK Bilateral 9/29/2022    Procedure: BLOCK MEDIAL BRANCH L3, L4, L5 #2;  Surgeon: Natalee Carter MD;  Location: [facility];  Service: Pain Management    • RHIZOTOMY Bilateral 10/25/2022    Procedure: RHIZOTOMY LUMBAR L3, L4, L5 medial branch nerves;  Surgeon: Natalee Carter MD;  Location: [facility];  Service: Pain Management        Family History   Problem Relation Age of Onset   • Diabetes Father    • Heart disease Father    • Diabetes Brother        Social History     Occupational History   • Occupation:   Tobacco Use   • Smoking status: Never   • Smokeless tobacco: Never   Vaping Use   • Vaping Use: Never used   Substance and Sexual Activity   • Alcohol use:  Yes     Alcohol/week: 2 0 - 4 0 standard drinks     Types: 2 - 4 Cans of beer per week   • Drug use: No   • Sexual activity: Not on file       Current Outpatient Medications on File Prior to Visit   Medication Sig   • Ascorbic Acid (VITAMIN C PO) 500 mg   • cholecalciferol (VITAMIN D3) 1,000 units tablet Take 1,000 Units by mouth daily   • Saw Palmetto, Serenoa repens, (SAW PALMETTO PO) Take by mouth   • Zinc Sulfate (ZINC 15 PO) Take by mouth   • hydrochlorothiazide (HYDRODIURIL) 25 mg tablet 25 mg (Patient not taking: Reported on 11/23/2022)     No current facility-administered medications on file prior to visit  Allergies   Allergen Reactions   • Tamsulosin      Joint pain with alfuzosin also  Physical Exam    /78 (BP Location: Right arm, Patient Position: Sitting, Cuff Size: Standard)   Pulse 75   Temp (!) 97 4 °F (36 3 °C)   Ht 5' 11 5" (1 816 m)   Wt 111 kg (244 lb)   SpO2 95%   BMI 33 56 kg/m²     Constitutional: normal, well developed, well nourished, alert, in no distress and non-toxic and no overt pain behavior  and obese  Eyes: anicteric  HEENT: grossly intact  Neck: supple, symmetric, trachea midline and no masses   Pulmonary:even and unlabored  Cardiovascular:No edema or pitting edema present  Skin:Normal without rashes or lesions and well hydrated  Psychiatric:Mood and affect appropriate  Neurologic:Cranial Nerves II-XII grossly intact Sensation grossly intact; no clonus negative barry's  Reflexes 2+ and brisk  SLR negative bilaterally  Spurling's maneuver negative bilaterally  Musculoskeletal:normal gait  5/5 strength bilaterally with AROM in all extremities  Normal heel toe and tip toe walking  Significant pain with lumbar facet loading bilaterally and with lateral spine rotation  ttp over lumbar paraspinal muscles  Negative edis's test, negative gaenslen's negative SIJ loading bilaterally  Imaging    LUMBAR SPINE     INDICATION:   M54 50: Low back pain, unspecified  G89 29:  Other chronic pain      COMPARISON:  None     VIEWS:  [facility] SPINE LUMBAR 2 OR 3 VIEWS INJURY        FINDINGS:     There are 5 non rib bearing lumbar vertebral bodies       There is no evidence of acute fracture or destructive osseous lesion  Note is made of rudimentary ribs at L1      Alignment is unremarkable       Facet joint degenerative arthropathy at L4-5 and L5-S1  Mild multilevel degenerative disc disease with endplate sclerosis and anterior osteophytes      The pedicles appear intact      Soft tissues are unremarkable      IMPRESSION:     No acute osseous abnormality        Mild multilevel degenerative changes

## 2022-12-09 ENCOUNTER — OFFICE VISIT (OUTPATIENT)
Dept: UROLOGY | Facility: MEDICAL CENTER | Age: 67
End: 2022-12-09

## 2022-12-09 VITALS
BODY MASS INDEX: 34.02 KG/M2 | HEIGHT: 71 IN | DIASTOLIC BLOOD PRESSURE: 74 MMHG | OXYGEN SATURATION: 94 % | HEART RATE: 76 BPM | WEIGHT: 243 LBS | SYSTOLIC BLOOD PRESSURE: 132 MMHG

## 2022-12-09 DIAGNOSIS — N20.0 CALCULUS OF KIDNEY: ICD-10-CM

## 2022-12-09 DIAGNOSIS — N40.0 BENIGN PROSTATIC HYPERPLASIA WITHOUT LOWER URINARY TRACT SYMPTOMS: Primary | ICD-10-CM

## 2022-12-09 DIAGNOSIS — R35.0 URINARY FREQUENCY: ICD-10-CM

## 2022-12-09 NOTE — PROGRESS NOTES
HISTORY:    BPH, flow is good, his main concern is frequent urination  He says that the frequency is much worse when it is cold and it leads to him drinking less fluids  Nocturia x1 on average  On saw palmetto for years, he is not sure if it really helps  PSA 3 5 in 2021  4 1 in 2019    CT scan in 2019 showed 1    2millimeter stone in the lower pole left kidney       ASSESSMENT / PLAN:    #1 unclear why cold weather leads to more frequency, he says he has been like that all his life  2   I encouraged him to not cut back on liquids, 64 ounces minimum all liquids per day would be good for him    3  Prostate feels normal, mildly enlarged  4   Check PSA  5  Follow-up 1 year    The following portions of the patient's history were reviewed and updated as appropriate: allergies, current medications, past family history, past medical history, past social history, past surgical history and problem list     Review of Systems   All other systems reviewed and are negative  Objective:     Physical Exam  Genitourinary:     Comments: Penis testes normal    Prostate mildly enlarged no nodules          0   Lab Value Date/Time    PSA 3 5 11/24/2021 0725    PSA 4 1 (H) 03/19/2019 0741   ]  No results found for: BUN  No results found for: CREATININE  No components found for: CBC      Patient Active Problem List   Diagnosis   • Calculus of kidney   • Benign prostatic hyperplasia without lower urinary tract symptoms   • Elevated PSA   • Chronic bilateral low back pain without sciatica   • Lumbar spondylosis - Bilateral   • Lumbar radiculopathy        Diagnoses and all orders for this visit:    Benign prostatic hyperplasia without lower urinary tract symptoms  -     PSA Total, Diagnostic; Future    Urinary frequency           Patient ID: Columba Castillo is a 75 y o  male        Current Outpatient Medications:   •  Ascorbic Acid (VITAMIN C PO), 500 mg, Disp: , Rfl:   •  cholecalciferol (VITAMIN D3) 1,000 units tablet, Take 1,000 Units by mouth daily, Disp: , Rfl:   •  Saw Palmetto, Serenoa repens, (SAW PALMETTO PO), Take by mouth, Disp: , Rfl:   •  Zinc Sulfate (ZINC 15 PO), Take by mouth, Disp: , Rfl:   •  gabapentin (NEURONTIN) 300 mg capsule, Take 1 capsule (300 mg total) by mouth 3 (three) times a day (Patient not taking: Reported on 12/9/2022), Disp: 90 capsule, Rfl: 0  •  hydrochlorothiazide (HYDRODIURIL) 25 mg tablet, 25 mg (Patient not taking: Reported on 11/23/2022), Disp: , Rfl:     Past Medical History:   Diagnosis Date   • COVID    • Kidney stone    • Lyme disease    • Urinary frequency        Past Surgical History:   Procedure Laterality Date   • CARPAL TUNNEL RELEASE     • COLONOSCOPY     • CYSTOSCOPY W/ STONE MANIPULATION     • FL GUIDED NEEDLE PLAC BX/ASP/INJ  9/15/2022   • FL GUIDED NEEDLE PLAC BX/ASP/INJ  9/29/2022   • NERVE BLOCK Bilateral 9/15/2022    Procedure: BLOCK MEDIAL BRANCH L3, L4, L5;  Surgeon: Saundra Mathis MD;  Location: [facility];  Service: Pain Management    • NERVE BLOCK Bilateral 9/29/2022    Procedure: BLOCK MEDIAL BRANCH L3, L4, L5 #2;  Surgeon: Saundra Mathis MD;  Location: [facility];  Service: Pain Management    • RHIZOTOMY Bilateral 10/25/2022    Procedure: RHIZOTOMY LUMBAR L3, L4, L5 medial branch nerves;  Surgeon: Saundra Mathis MD;  Location: [facility];  Service: Pain Management        Social History

## 2022-12-13 ENCOUNTER — HOSPITAL ENCOUNTER (OUTPATIENT)
Facility: HOSPITAL | Age: 67
Setting detail: OUTPATIENT SURGERY
Discharge: HOME | End: 2022-12-14
Attending: ANESTHESIOLOGY | Admitting: ANESTHESIOLOGY

## 2022-12-13 ENCOUNTER — APPOINTMENT (OUTPATIENT)
Dept: RADIOLOGY | Facility: HOSPITAL | Age: 67
End: 2022-12-13

## 2022-12-13 VITALS
WEIGHT: 244 LBS | BODY MASS INDEX: 34.16 KG/M2 | HEIGHT: 71 IN | TEMPERATURE: 98.3 F | HEART RATE: 68 BPM | OXYGEN SATURATION: 94 % | RESPIRATION RATE: 18 BRPM

## 2022-12-13 RX ORDER — METHYLPREDNISOLONE ACETATE 80 MG/ML
INJECTION, SUSPENSION INTRA-ARTICULAR; INTRALESIONAL; INTRAMUSCULAR; SOFT TISSUE AS NEEDED
Status: DISCONTINUED | OUTPATIENT
Start: 2022-12-13 | End: 2022-12-13 | Stop reason: HOSPADM

## 2022-12-13 RX ORDER — LIDOCAINE HYDROCHLORIDE 10 MG/ML
INJECTION, SOLUTION EPIDURAL; INFILTRATION; INTRACAUDAL; PERINEURAL AS NEEDED
Status: DISCONTINUED | OUTPATIENT
Start: 2022-12-13 | End: 2022-12-13 | Stop reason: HOSPADM

## 2022-12-13 RX ORDER — SODIUM CHLORIDE 9 MG/ML
INJECTION INTRAVENOUS AS NEEDED
Status: DISCONTINUED | OUTPATIENT
Start: 2022-12-13 | End: 2022-12-13 | Stop reason: HOSPADM

## 2022-12-13 NOTE — DISCHARGE INSTR - AVS FIRST PAGE
YOUR 2 WEEK FOLLOW UP HAS BEEN SCHEDULED; IF YOU WISH TO CHANGE THE FOLLOW UP, PLEASE CALL THE [facility] [place]: [phone]    Information:  An epidural steroid injection (ROEL) is an injection of a small dose of anti-inflammatory medication (called  a glucocorticoid) into the lower back to relieve pain in your legs or lower back  The medication is  injected into an area of fatty tissue surrounding the spinal nerves called the epidural space  By reducing  inflammation, an ROEL can help reduce your pain  An ROEL can be both a treatment and a way to diagnose a  specific nerve root problem when there is a question  The day of the procedure:  Â· You will be fully awake during the procedure  Sedation is  occasionally used  Discuss with your physician or staff when  scheduling the procedure  Â· If you have diabetes, your blood sugar numbers may  increase  Your primary care physician or our staff will   you regarding management  Bring your diabetes  medication with you so you can take it after the procedure  Â· Continue to take all medications, ESPECIALLY BLOOD  PRESSURE MEDICATIONS  Bring all your medications with  you so you can take them as needed after the procedure  Please note: your blood sugar and blood pressure will need  to be within a safe range on the day of the procedure  During the procedure:  Â· The procedure will take approximately 10 to 30 minutes  Â· You will be lying face down during the procedure  After the procedure:  Â· Pain relief may begin immediately after the medication has  been injected  You may experience a brief recurrence of  your former pain until the anti-inflammatory medication  takes effect  Apply ice to the injection site to decrease  discomfort  Â· A bandage may be placed over the injection site  Â· You will rest, lying down, in a recovery room for 15 to 30 minutes  A nurse will check your blood pressure and pulse   The nurse will also discuss your discharge  instructions with you  Â· A responsible adult must drive you home  You must not drive yourself  Â· Some people may experience numbness or an inability to walk for a short time after the  procedure  If this occurs after your procedure, a wheelchair can be provided to assist you to the  car  Discharge instructions:  Â· Do not drive or operate machinery for at least 24 hours after the procedure  Â· You may eat your normal diet  Â· Do not participate in strenuous activity that day  Â· You may remove any bandages the morning following the injection  Â· You may take a shower  Do not take a bath or sit in a hot tub for 24 hours  Â· If you are taking a blood thinner like Aspirin, Plavix, Ticlid, Coumadin, Heparin, Lovenox, among  others, you may re-start it  Occasionally, a situation will require prompt attention and an emergency room visit is necessary:  Â· Increasing redness, swelling or drainage from the puncture site    Â· Increasing pain or new symptoms  Â· Any shaking, chills and/or a temperature over 100 3F

## 2022-12-13 NOTE — PROCEDURES
Pre-procedure Diagnosis:       Lumbar Radiculopathy  Post-procedure Diagnosis:     Lumbar Radiculopathy  Procedure Title(s):                  1  [L4-L5] interlaminar epidural steroid injection                                                  2  Intraoperative fluoroscopy  Attending Surgeon:                 David Tolentino MD  Anesthesia:                             Local      Indications: The patient is a  75y o  year-old male  with a diagnosis of Lumbar Radiculopathy  The patient's history and physical exam were reviewed  The risks, benefits and alternatives to the procedure were discussed, and all questions were answered to the patient's satisfaction  The patient agreed to proceed, and written informed consent was obtained  Procedure in Detail: The patient was brought into the procedure room and placed in the prone position on the fluoroscopy table  The area of the lumbar spine was prepped with chlorhexidine gluconate solution times one and draped in a sterile manner  The [L4-L5] interspace was identified and marked under AP fluoroscopy  The skin and subcutaneous tissues in the area were anesthetized with 1% lidocaine  A 18-gauge Tuohy epidural needle was directed toward the interspace under fluoroscopic guidance until the ligamentum flavum was engaged  From this point, a loss of resistance technique with saline was used to identify entrance of the needle into the epidural space  Once an appropriate loss was obtained, negative aspiration was confirmed, and 1 ml Omnipaque 300 contrast solution was injected  An appropriate epidurogram was noted  Then, after negative aspiration, a solution consisting of 1-mL depo-medrol (80mg/mL) and 3-mL preservative-free saline was easily injected  The needle was removed with a 1% lidocaine flush  The patient's back was cleaned and a bandage was placed over the site of needle insertion       Disposition: The patient tolerated the procedure well, and there were no apparent complications  The patient was taken to the recovery area where written discharge instructions for the procedure were given        Estimated Blood Loss: None  Specimens Obtained: N/A

## 2022-12-13 NOTE — INTERVAL H&P NOTE
H&P reviewed  After examining the patient I find no changes in the patients condition since the H&P had been written      Vitals:    12/13/22 1105   Pulse: 72   Resp: 18   Temp: 98 7 °F (37 1 °C)   SpO2: 95%

## 2022-12-13 NOTE — OP NOTE
OPERATIVE REPORT  PATIENT NAME: Blaine Bailey    :  1955  MRN: [id number]  Pt Location:  GI ROOM 01    SURGERY DATE: 2022    Surgeon(s) and Role:      Kandice Solis MD - Primary    Preop Diagnosis:  Lumbar radiculopathy [M54 16]    Post-Op Diagnosis Codes:     * Lumbar radiculopathy [M54 16]    Procedure(s) (LRB):  BLOCK / INJECTION EPIDURAL STEROID LUMBAR L4-L5 or alternate level (N/A)    Specimen(s):  * No specimens in log *    Estimated Blood Loss:   Minimal    Drains:  * No LDAs found *    Anesthesia Type:   Local    Operative Indications:  Lumbar radiculopathy [M54 16]    Operative Findings:  L4-L5 epidurogram    Complications:   None    Procedure and Technique:  Please see detailed procedure note     I was present for the entire procedure    Patient Disposition:  PACU     SIGNATURE: Vesna Tsai MD  DATE: 2022  TIME: 11:52 AM

## 2022-12-16 ENCOUNTER — APPOINTMENT (OUTPATIENT)
Dept: LAB | Facility: CLINIC | Age: 67
End: 2022-12-16

## 2022-12-16 DIAGNOSIS — N40.0 BENIGN PROSTATIC HYPERPLASIA WITHOUT LOWER URINARY TRACT SYMPTOMS: ICD-10-CM

## 2022-12-16 DIAGNOSIS — K76.0 HEPATIC STEATOSIS: ICD-10-CM

## 2022-12-16 DIAGNOSIS — R97.20 ELEVATED PSA: ICD-10-CM

## 2022-12-16 DIAGNOSIS — R79.89 ELEVATED FERRITIN: ICD-10-CM

## 2022-12-16 DIAGNOSIS — R74.8 ELEVATED LIVER ENZYMES: ICD-10-CM

## 2022-12-16 LAB
ALBUMIN SERPL BCP-MCNC: 4.2 G/DL (ref 3.5–5)
ALP SERPL-CCNC: 113 U/L (ref 46–116)
ALT SERPL W P-5'-P-CCNC: 92 U/L (ref 12–78)
AST SERPL W P-5'-P-CCNC: 62 U/L (ref 5–45)
BILIRUB DIRECT SERPL-MCNC: 0.23 MG/DL (ref 0–0.2)
BILIRUB SERPL-MCNC: 1.43 MG/DL (ref 0.2–1)
CK MB SERPL-MCNC: 1.8 % (ref 0–2.5)
CK MB SERPL-MCNC: 5.4 NG/ML (ref 0–5)
CK SERPL-CCNC: 302 U/L (ref 39–308)
ERYTHROCYTE [DISTWIDTH] IN BLOOD BY AUTOMATED COUNT: 13.6 % (ref 11.6–15.1)
FERRITIN SERPL-MCNC: 521 NG/ML (ref 8–388)
HCT VFR BLD AUTO: 44.3 % (ref 36.5–49.3)
HGB BLD-MCNC: 14.7 G/DL (ref 12–17)
IRON SATN MFR SERPL: 33 % (ref 20–50)
IRON SERPL-MCNC: 113 UG/DL (ref 65–175)
MCH RBC QN AUTO: 31 PG (ref 26.8–34.3)
MCHC RBC AUTO-ENTMCNC: 33.2 G/DL (ref 31.4–37.4)
MCV RBC AUTO: 94 FL (ref 82–98)
PLATELET # BLD AUTO: 204 THOUSANDS/UL (ref 149–390)
PMV BLD AUTO: 10.4 FL (ref 8.9–12.7)
PROT SERPL-MCNC: 7.9 G/DL (ref 6.4–8.4)
RBC # BLD AUTO: 4.74 MILLION/UL (ref 3.88–5.62)
TIBC SERPL-MCNC: 340 UG/DL (ref 250–450)
TRANSFERRIN SERPL-MCNC: 262 MG/DL (ref 200–400)
TSH SERPL DL<=0.05 MIU/L-ACNC: 1.67 UIU/ML (ref 0.45–4.5)
WBC # BLD AUTO: 6.27 THOUSAND/UL (ref 4.31–10.16)

## 2022-12-17 LAB
A1AT SERPL-MCNC: 139 MG/DL (ref 101–187)
ACTIN IGG SERPL-ACNC: 5 UNITS (ref 0–19)
ANA SER QL IA: NEGATIVE
HAV AB SER QL IA: REACTIVE
HBV CORE AB SER QL: NORMAL
HBV CORE IGM SER QL: NORMAL
LKM-1 AB SER-ACNC: <20.1 UNITS (ref 0–20)
MITOCHONDRIA M2 IGG SER-ACNC: <20 UNITS (ref 0–20)
TTG IGA SER-ACNC: <2 U/ML (ref 0–3)

## 2022-12-18 LAB — PSA SERPL-MCNC: 3 NG/ML (ref 0–4)

## 2022-12-20 ENCOUNTER — TELEPHONE (OUTPATIENT)
Dept: PAIN MEDICINE | Facility: CLINIC | Age: 67
End: 2022-12-20

## 2022-12-20 LAB
A2 MACROGLOB SERPL-MCNC: 462 MG/DL (ref 110–276)
ALBUMIN SERPL ELPH-MCNC: 5.12 G/DL (ref 3.5–5)
ALBUMIN SERPL ELPH-MCNC: 64 % (ref 52–65)
ALPHA1 GLOB SERPL ELPH-MCNC: 0.26 G/DL (ref 0.1–0.4)
ALPHA1 GLOB SERPL ELPH-MCNC: 3.3 % (ref 2.5–5)
ALPHA2 GLOB SERPL ELPH-MCNC: 1.06 G/DL (ref 0.4–1.2)
ALPHA2 GLOB SERPL ELPH-MCNC: 13.3 % (ref 7–13)
ALT SERPL W P-5'-P-CCNC: 82 IU/L (ref 0–55)
APO A-I SERPL-MCNC: 138 MG/DL (ref 101–178)
BETA GLOB ABNORMAL SERPL ELPH-MCNC: 0.4 G/DL (ref 0.4–0.8)
BETA1 GLOB SERPL ELPH-MCNC: 5 % (ref 5–13)
BETA2 GLOB SERPL ELPH-MCNC: 4.6 % (ref 2–8)
BETA2+GAMMA GLOB SERPL ELPH-MCNC: 0.37 G/DL (ref 0.2–0.5)
BILIRUB SERPL-MCNC: 1.2 MG/DL (ref 0–1.2)
COMMENT: ABNORMAL
FIBROSIS SCORING:: ABNORMAL
FIBROSIS STAGE SERPL QL: ABNORMAL
GAMMA GLOB ABNORMAL SERPL ELPH-MCNC: 0.78 G/DL (ref 0.5–1.6)
GAMMA GLOB SERPL ELPH-MCNC: 9.8 % (ref 12–22)
GGT SERPL-CCNC: 68 IU/L (ref 0–65)
HAPTOGLOB SERPL-MCNC: 114 MG/DL (ref 32–363)
IGG/ALB SER: 1.78 {RATIO} (ref 1.1–1.8)
INTERPRETATIONS: ABNORMAL
LIVER FIBR SCORE SERPL CALC.FIBROSURE: 0.89 (ref 0–0.21)
NECROINFLAMM ACTIVITY SCORING:: ABNORMAL
NECROINFLAMMATORY ACT GRADE SERPL QL: ABNORMAL
NECROINFLAMMATORY ACT SCORE SERPL: 0.7 (ref 0–0.17)
PROT PATTERN SERPL ELPH-IMP: ABNORMAL
PROT SERPL-MCNC: 8 G/DL (ref 6.4–8.2)
SERVICE CMNT-IMP: ABNORMAL

## 2022-12-28 ENCOUNTER — OFFICE VISIT (OUTPATIENT)
Dept: PAIN MEDICINE | Facility: CLINIC | Age: 67
End: 2022-12-28

## 2022-12-28 VITALS
SYSTOLIC BLOOD PRESSURE: 132 MMHG | HEART RATE: 85 BPM | BODY MASS INDEX: 34.22 KG/M2 | WEIGHT: 244.4 LBS | HEIGHT: 71 IN | RESPIRATION RATE: 20 BRPM | DIASTOLIC BLOOD PRESSURE: 68 MMHG | TEMPERATURE: 98 F

## 2022-12-28 DIAGNOSIS — M54.16 LUMBAR RADICULOPATHY: ICD-10-CM

## 2022-12-28 DIAGNOSIS — M47.816 LUMBAR SPONDYLOSIS: Primary | ICD-10-CM

## 2022-12-28 NOTE — PROGRESS NOTES
Assessment  1  Lumbar spondylosis - Bilateral    2  Lumbar radiculopathy    Greater than 80% relief of pain with improved ability to participate with IADLs after bilateral L3, L4, L5 medial branch blocks #1 and #2  One month s/p ablation without significant relief  sxs likely from proximal radicular features of L4 radiculopathy as greater than 80% relief from recent ILESI at L4-L5  Has not utilized gabapentin for pain control since having prescribed it due to concerns for further liver injury  Denies bowel/bladder issues, saddle anesthesia or gait insability  Previously reported the following symptomatology:     Axial low back pain described primarily by arthritic features  Aching, nagging, indolent, stabbing, throbbing features in axial low back without radicular components  5/5 strength bilaterally, negative SLR  Positive facet loading maneuvers in lumbar spine elicited pain, positive tenderness to palpation over lumbar paraspinal muscles  Findings correlate with prominent lumbar facet arthropathy seen throughout axial low back on x-ray  Currently he is neurologically intact without gait instability, saddle anesthesia or bowel/bladder abnormality  Risks, benefits alternative to medial branch blocks and subsequent radiofrequency ablation of successful thoroughly discussed with patient  Handouts provided questions answered to patient satisfaction  Plan  -f/u prn  -gabapentin 300 mg t i d  Ordered for patient; counseled regarding sedative effects of taking this medication and provided up titration calendar  Counseled not to take medication while driving or operating heavy machinery/using stairs  -continue home exercises and physical therapy for lumbar radiculopathy; Physician directed home exercise plan as per [facility] demonstrated and handouts provided that patient plans to participate with for 1 hour, twice a week for the next 6 weeks       There are risks associated with opioid medications, including dependence, addiction and tolerance  The patient understands and agrees to use these medications only as prescribed  Potential side effects of the medications include, but are not limited to, constipation, drowsiness, addiction, impaired judgment and risk of fatal overdose if not taken as prescribed  The patient was warned against driving while taking sedation medications or operating heavy machinery  The patient voiced understanding  Sharing medications is a felony  At this point in time, the patient is showing no signs of addiction, abuse, diversion or suicidal ideation  [place] Prescription Drug Monitoring Program report was reviewed and was appropriate      Complete risks and benefits including bleeding, infection, tissue reaction, nerve injury and allergic reaction were discussed  The approach was demonstrated using models and literature was provided  Verbal and written consent was obtained  My impressions and treatment recommendations were discussed in detail with the patient who verbalized understanding and had no further questions  Discharge instructions were provided  I personally saw and examined the patient and I agree with the above discussed plan of care  No orders of the defined types were placed in this encounter  History of Present Illness    Greater than 80% relief of pain with improved ability to participate with IADLs after bilateral L3, L4, L5 medial branch blocks #1 and #2  One month s/p ablation without significant relief  sxs likely from proximal radicular features of L4 radiculopathy as greater than 80% relief from recent ILESI at L4-L5  Has not utilized gabapentin for pain control since having prescribed it due to concerns for further liver injury  Denies bowel/bladder issues, saddle anesthesia or gait insability   Previously reported the following symptomatology:     Joyce Sapp is a 75 y o  male  With pmhx of lyme disease, urinary stones/frequency presenting with a past medical history of chronic low back pain described primarily as arthritic in nature  He describes 8/10 low back pain that is worse in the mornings and worse at the end of the day  The pain is characterized by achy, nagging, indolent, crampy, stabbing pain in his axial low back  The patient describes that the pain is worse with standing for long periods of time on hard surfaces as well as with walking  The patient is a very active individual and feels as though this pain compromises his participation with independent activities of daily living  The pain can be debilitating at times and contribute to significant disability, compromising overall activity and independent activities of daily living  He has tried physical therapy with good relief of symptoms  Medications the patient has tried in the past include nsaids  He describes no radicular symptoms and has good strength  He denies any weakness numbness or paresthesias  The patient denies any saddle anesthesia, gait abnormality or incontinence  I have personally reviewed and/or updated the patient's past medical history, past surgical history, family history, social history, current medications, allergies, and vital signs today  Review of Systems   Constitutional: Positive for activity change  HENT: Negative  Eyes: Negative  Respiratory: Negative  Cardiovascular: Negative  Gastrointestinal: Negative  Endocrine: Negative  Genitourinary: Negative  Musculoskeletal: Positive for arthralgias, back pain, gait problem and myalgias  Skin: Negative  Allergic/Immunologic: Negative  Neurological: Negative for weakness and numbness  Hematological: Negative  Psychiatric/Behavioral: Negative  All other systems reviewed and are negative        Patient Active Problem List   Diagnosis   • Calculus of kidney   • Benign prostatic hyperplasia without lower urinary tract symptoms   • Elevated PSA   • Chronic bilateral low back pain without sciatica   • Lumbar spondylosis - Bilateral   • Lumbar radiculopathy       Past Medical History:   Diagnosis Date   • COVID    • Kidney stone    • Lyme disease    • Urinary frequency        Past Surgical History:   Procedure Laterality Date   • CARPAL TUNNEL RELEASE     • COLONOSCOPY     • CYSTOSCOPY W/ STONE MANIPULATION     • EPIDURAL BLOCK INJECTION N/A 12/13/2022    Procedure: BLOCK / INJECTION EPIDURAL STEROID LUMBAR L4-L5 or alternate level;  Surgeon: Tito Gaona MD;  Location: [facility];  Service: Pain Management    • FL GUIDED NEEDLE PLAC BX/ASP/INJ  9/15/2022   • FL GUIDED NEEDLE PLAC BX/ASP/INJ  9/29/2022   • NERVE BLOCK Bilateral 9/15/2022    Procedure: BLOCK MEDIAL BRANCH L3, L4, L5;  Surgeon: Tito Gaona MD;  Location: [facility];  Service: Pain Management    • NERVE BLOCK Bilateral 9/29/2022    Procedure: BLOCK MEDIAL BRANCH L3, L4, L5 #2;  Surgeon: Tito Gaona MD;  Location: [facility];  Service: Pain Management    • RHIZOTOMY Bilateral 10/25/2022    Procedure: RHIZOTOMY LUMBAR L3, L4, L5 medial branch nerves;  Surgeon: Tito Gaona MD;  Location: [facility];  Service: Pain Management        Family History   Problem Relation Age of Onset   • Diabetes Father    • Heart disease Father    • Diabetes Brother        Social History     Occupational History   • Occupation:   Tobacco Use   • Smoking status: Never   • Smokeless tobacco: Never   Vaping Use   • Vaping Use: Never used   Substance and Sexual Activity   • Alcohol use:  Yes     Alcohol/week: 2 0 - 4 0 standard drinks     Types: 2 - 4 Cans of beer per week   • Drug use: No   • Sexual activity: Not on file       Current Outpatient Medications on File Prior to Visit   Medication Sig   • Ascorbic Acid (VITAMIN C PO) 500 mg   • cholecalciferol (VITAMIN D3) 1,000 units tablet Take 1,000 Units by mouth daily   • hydrochlorothiazide (HYDRODIURIL) 25 mg tablet 25 mg   • Saw Palmetto, Serenoa repens, (SAW PALMETTO PO) Take by mouth   • Zinc Sulfate (ZINC 15 PO) Take by mouth   • gabapentin (NEURONTIN) 300 mg capsule Take 1 capsule (300 mg total) by mouth 3 (three) times a day (Patient not taking: Reported on 12/9/2022)     No current facility-administered medications on file prior to visit  Allergies   Allergen Reactions   • Tamsulosin      Joint pain with alfuzosin also  Physical Exam    /68   Pulse 85   Temp 98 °F (36 7 °C)   Resp 20   Ht 5' 11" (1 803 m)   Wt 111 kg (244 lb 6 4 oz)   BMI 34 09 kg/m²     Constitutional: normal, well developed, well nourished, alert, in no distress and non-toxic and no overt pain behavior  and obese  Eyes: anicteric  HEENT: grossly intact  Neck: supple, symmetric, trachea midline and no masses   Pulmonary:even and unlabored  Cardiovascular:No edema or pitting edema present  Skin:Normal without rashes or lesions and well hydrated  Psychiatric:Mood and affect appropriate  Neurologic:Cranial Nerves II-XII grossly intact Sensation grossly intact; no clonus negative barry's  Reflexes 2+ and brisk  SLR negative bilaterally  Spurling's maneuver negative bilaterally  Musculoskeletal:normal gait  5/5 strength bilaterally with AROM in all extremities  Normal heel toe and tip toe walking  Significant pain with lumbar facet loading bilaterally and with lateral spine rotation  ttp over lumbar paraspinal muscles  Negative edis's test, negative gaenslen's negative SIJ loading bilaterally  Imaging    LUMBAR SPINE     INDICATION:   M54 50: Low back pain, unspecified  G89 29: Other chronic pain      COMPARISON:  None     VIEWS:  [facility] SP[facility] LUMBAR 2 OR 3 VIEWS INJURY        FINDINGS:     There are 5 non rib bearing lumbar vertebral bodies       There is no evidence of acute fracture or destructive osseous lesion  Note is made of rudimentary ribs at L1      Alignment is unremarkable       Facet joint degenerative arthropathy at L4-5 and L5-S1    Mild multilevel degenerative disc disease with endplate sclerosis and anterior osteophytes      The pedicles appear intact      Soft tissues are unremarkable      IMPRESSION:     No acute osseous abnormality        Mild multilevel degenerative changes

## 2022-12-28 NOTE — PATIENT INSTRUCTIONS
Core Strengthening Exercises   WHAT YOU NEED TO KNOW:   Your core includes the muscles of your lower back, hip, pelvis, and abdomen  Core strengthening exercises help heal and strengthen these muscles  This helps prevent another injury, and keeps your pelvis, spine, and hips in the correct position  DISCHARGE INSTRUCTIONS:   Contact your healthcare provider if:   You have sharp or worsening pain during exercise or at rest     You have questions or concerns about your shoulder exercises  Safety tips:  Talk to your healthcare provider before you start an exercise program  A physical therapist can teach you how to do core strengthening exercises safely  Do the exercises on a mat or firm surface  A firm surface will support your spine and prevent low back pain  Do not do these exercises on a bed  Move slowly and smoothly  Avoid fast or jerky motions  Stop if you feel pain  Core exercises should not be painful  Stop if you feel pain  Breathe normally during core exercises  Do not hold your breath  This may cause an increase in blood pressure and prevent muscle strengthening  Your healthcare provider will tell you when to inhale and exhale during the exercise  Begin all of your exercises with abdominal bracing  Abdominal bracing helps warm up your core muscles  You can also practice abdominal bracing throughout the day  Lie on your back with your knees bent and feet flat on the floor  Place your arms in a relaxed position beside your body  Tighten your abdominal muscles  Pull your belly button in and up toward your spine  Hold for 5 seconds  Relax your muscles  Repeat 10 times  Core strengthening exercises: Your healthcare provider will tell you how often to do these exercises  The provider will also tell you how many repetitions of each exercise you should do  Hold each exercise for 5 seconds or as directed  As you get stronger, increase your hold to 10 to 15 seconds   You can do some of these exercises on a stability ball, or with a weight  Ask your healthcare provider how to use a stability ball or weight for these exercises:  Bridging:  Lie on your back with your knees bent and feet flat on the floor  Rest your arms at your side  Tighten your buttocks, and then lift your hips 1 inch off the floor  Hold for 5 seconds  When you can do this exercise without pain for 10 seconds, increase the distance you lift your hips  A good goal is to be able to lift your hips so that your shoulders, hips, and knees are in a straight line  Dead bug:  Lie on your back with your knees bent and feet flat on the floor  Place your arms in a relaxed position beside your body  Begin with abdominal bracing  Next, raise one leg, keeping your knee bent  Hold for 5 seconds  Repeat with the other leg  When you can do this exercise without pain for 10 to 15 seconds, you may raise one straight leg and hold  Repeat with the other leg  Quadruped:  Place your hands and knees on the floor  Keep your wrists directly below your shoulders and your knees directly below your hips  Pull your belly button in toward your spine  Do not flatten or arch your back  Tighten your abdominal muscles below your belly button  Hold for 5 seconds  When you can do this exercise without pain for 10 to 15 seconds, you may extend one arm and hold  Repeat on the other side  Side bridge exercises:      Standing side bridge:  Stand next to a wall and extend one arm toward the wall  Place your palm flat on the wall with your fingers pointing upward  Begin with abdominal bracing  Next, without moving your feet, slowly bend your arm to 90 degrees  Hold for 5 seconds  Repeat on the other side  When you can do this exercise without pain for 10 to 15 seconds, you may do the bent leg side bridge on the floor  Bent leg side bridge:  Lie on one side with your legs, hips, and shoulders in a straight line   Prop yourself up onto your forearm so your elbow is directly below your shoulder  Bend your knees back to 90 degrees  Begin with abdominal bracing  Next, lift your hips and balance yourself on your forearm and knees  Hold for 5 seconds  Repeat on the other side  When you can do this exercise without pain for 10 to 15 seconds, you may do the straight leg side bridge on the floor  Straight leg side bridge:  Lie on one side with your legs, hips, and shoulders in a straight line  Prop yourself up onto your forearm so your elbow is directly below your shoulder  Begin with abdominal bracing  Lift your hips off the floor and balance yourself on your forearm and the outside of your flexed foot  Do not let your ankle bend sideways  Hold for 5 seconds  Repeat on the other side  When you can do this exercise without pain for 10 to 15 seconds, ask your healthcare provider for more advanced exercises  Superman:  Lie on your stomach  Extend your arms forward on the floor  Tighten your abdominal muscles and lift your right hand and left leg off the floor  Hold this position  Slowly return to the starting position  Tighten your abdominal muscles and lift your left hand and right leg off the floor  Hold this position  Slowly return to the starting position  Clam:  Lie on your side with your knees bent  Put your bottom arm under your head to keep your neck in line  Put your top hand on your hip to keep your pelvis from moving  Put your heels together, and keep them together during this exercise  Slowly raise your top knee toward the ceiling  Then lower your leg so your knees are together  Repeat this exercise 10 times  Then switch sides and do the exercise 10 times with the other leg  Curl up:  Lie on your back with your knees bent and feet flat on the floor  Place your hands, palms down, underneath your lower back  Next, with your elbows on the floor, lift your shoulders and chest 2 to 3 inches off the floor   Keep your head in line with your shoulders  Hold this position  Slowly return to the starting position  Straight leg raises:  Lie on your back with one leg straight  Bend the other knee and place your foot flat on the floor  Tighten your abdominal muscles  Keep your leg straight and slowly lift it straight up 6 to 12 inches off the floor  Hold this position  Lower your leg slowly  Do as many repetitions as directed on this side  Repeat with the other leg  Plank:  Lie on your stomach  Bend your elbows and place your forearms flat on the floor  Lift your chest, stomach, and knees off the floor  Make sure your elbows are below your shoulders  Your body should be in a straight line  Do not let your hips or lower back sink to the ground  Squeeze your abdominal muscles together and hold for 15 seconds  To make this exercise harder, hold for 30 seconds or lift 1 leg at a time  Bicycles:  Lie on your back  Bend both knees and bring them toward your chest  Your calves should be parallel to the floor  Place the palms of your hands on the back of your head  Straighten your right leg and keep it lifted 2 inches off the floor  Raise your head and shoulders off the floor and twist towards your left  Keep your head and shoulders lifted  Bend your right knee while you straighten your left leg  Keep your left leg 2 inches off the floor  Twist your head and chest towards the left leg  Continue to straighten 1 leg at a time and twist        Follow up with your doctor as directed:  Write down your questions so you remember to ask them during your visits  © Copyright [employer] 2022 Information is for End User's use only and may not be sold, redistributed or otherwise used for commercial purposes  All illustrations and images included in CareNotes® are the copyrighted property of [employer]  or [facility] Marlon Dooley   The above information is an  only   It is not intended as medical advice for individual conditions or treatments  Talk to your doctor, nurse or pharmacist before following any medical regimen to see if it is safe and effective for you

## 2023-01-30 ENCOUNTER — TELEPHONE (OUTPATIENT)
Dept: OBGYN CLINIC | Facility: HOSPITAL | Age: 68
End: 2023-01-30

## 2023-01-30 DIAGNOSIS — M54.16 LUMBAR RADICULOPATHY: ICD-10-CM

## 2023-01-30 DIAGNOSIS — M47.816 LUMBAR SPONDYLOSIS: Primary | ICD-10-CM

## 2023-01-30 NOTE — TELEPHONE ENCOUNTER
Caller: [facility]    Doctor: Dexter Elias    Reason for call: Previous physical therapy order will  soon, can they get a new one sent to them via fax at [phone]    Call back#: [phone]

## 2023-03-06 ENCOUNTER — TELEPHONE (OUTPATIENT)
Dept: PAIN MEDICINE | Facility: CLINIC | Age: 68
End: 2023-03-06

## 2023-03-06 NOTE — TELEPHONE ENCOUNTER
Caller: [facility]    Doctor: Dr Nirmala Baumann     Reason for call: Schedule a procedure prior to 03/15/2023    Call back#: [phone]

## 2023-03-07 ENCOUNTER — PREP FOR PROCEDURE (OUTPATIENT)
Dept: PAIN MEDICINE | Facility: CLINIC | Age: 68
End: 2023-03-07

## 2023-03-07 DIAGNOSIS — M54.16 LUMBAR RADICULOPATHY: Primary | ICD-10-CM

## 2023-03-07 NOTE — TELEPHONE ENCOUNTER
Spoke to patient  Scheduled L4-L5 ILESI 3/14/2023  Reviewed instructions with patient  No food/drink one hour prior  Wear comfortable clothing  A  is required  Continue all prescribed medications on day of procedure  Call if prescribed an antibiotic or become ill  Refrain from vaccinations two weeks prior and after procedure  Call with questions

## 2023-03-13 NOTE — DISCHARGE INSTR - AVS FIRST PAGE
YOUR 2 WEEK FOLLOW UP HAS BEEN SCHEDULED; IF YOU WISH TO CHANGE THE FOLLOW UP, PLEASE CALL THE [facility] [place]: [phone]    Epidural Steroid Injection   WHAT YOU NEED TO KNOW:   An epidural steroid injection (ROEL) is a procedure to inject steroid medicine into the epidural space  The epidural space is between your spinal cord and vertebrae  Steroids reduce inflammation and fluid buildup in your spine that may be causing pain  You may be given pain medicine along with the steroids  DISCHARGE INSTRUCTIONS:   Call your local emergency number (911 in the [address] [facility]) if:   You have a seizure  You have trouble moving your legs  Seek care immediately if:   Blood soaks through your bandage  You have a fever or chills, severe back pain, and the procedure area is sensitive to the touch  You cannot control when you urinate or have a bowel movement  Call your doctor if:   You have weakness or numbness in your legs  Your wound is red, swollen, or draining pus  You have nausea or are vomiting  Your face or neck is red and you feel warm  You have more pain than you had before the procedure  You have swelling in your hands or feet  You have questions or concerns about your condition or care  Care for your wound as directed: You may remove the bandage before you go to bed the day of your procedure  You may take a shower, but do not take a bath for at least 24 hours  Self-care:   Do not drive,  use machines, or do strenuous activity for 24 hours after your procedure or as directed  Continue other treatments  as directed  Steroid injections alone will not control your pain  The injections are meant to be used with other treatments, such as physical therapy  Follow up with your doctor as directed:  Write down your questions so you remember to ask them during your visits     © Copyright [employer]  Information is for End User's use only and may not be sold, redistributed or otherwise used for commercial purposes  The above information is an  only  It is not intended as medical advice for individual conditions or treatments  Talk to your doctor, nurse or pharmacist before following any medical regimen to see if it is safe and effective for you

## 2023-03-14 ENCOUNTER — HOSPITAL ENCOUNTER (OUTPATIENT)
Facility: HOSPITAL | Age: 68
Setting detail: OUTPATIENT SURGERY
Discharge: HOME | End: 2023-03-14
Attending: ANESTHESIOLOGY | Admitting: ANESTHESIOLOGY

## 2023-03-14 ENCOUNTER — APPOINTMENT (OUTPATIENT)
Dept: RADIOLOGY | Facility: HOSPITAL | Age: 68
End: 2023-03-14

## 2023-03-14 VITALS
DIASTOLIC BLOOD PRESSURE: 69 MMHG | BODY MASS INDEX: 32.9 KG/M2 | RESPIRATION RATE: 18 BRPM | HEART RATE: 71 BPM | SYSTOLIC BLOOD PRESSURE: 164 MMHG | HEIGHT: 71 IN | WEIGHT: 235 LBS | TEMPERATURE: 97.9 F | OXYGEN SATURATION: 96 %

## 2023-03-14 RX ORDER — SODIUM CHLORIDE 9 MG/ML
INJECTION INTRAVENOUS AS NEEDED
Status: DISCONTINUED | OUTPATIENT
Start: 2023-03-14 | End: 2023-03-14 | Stop reason: HOSPADM

## 2023-03-14 RX ORDER — LIDOCAINE HYDROCHLORIDE 10 MG/ML
INJECTION, SOLUTION EPIDURAL; INFILTRATION; INTRACAUDAL; PERINEURAL AS NEEDED
Status: DISCONTINUED | OUTPATIENT
Start: 2023-03-14 | End: 2023-03-14 | Stop reason: HOSPADM

## 2023-03-14 RX ORDER — METHYLPREDNISOLONE ACETATE 80 MG/ML
INJECTION, SUSPENSION INTRA-ARTICULAR; INTRALESIONAL; INTRAMUSCULAR; SOFT TISSUE AS NEEDED
Status: DISCONTINUED | OUTPATIENT
Start: 2023-03-14 | End: 2023-03-14 | Stop reason: HOSPADM

## 2023-03-14 NOTE — H&P
Assessment  1  Lumbar spondylosis - Bilateral    2  Lumbar radiculopathy    Greater than 80% relief of pain with improved ability to participate with IADLs after bilateral L3, L4, L5 medial branch blocks #1 and #2 and ablation  proximal radicular features of L4 radiculopathy as greater than 80% relief from recent ILESI at L4-L5  Has not utilized gabapentin for pain control since having prescribed it due to concerns for further liver injury  Denies bowel/bladder issues, saddle anesthesia or gait insability  Previously reported the following symptomatology:     Axial low back pain described primarily by arthritic features  Aching, nagging, indolent, stabbing, throbbing features in axial low back without radicular components  5/5 strength bilaterally, negative SLR  Positive facet loading maneuvers in lumbar spine elicited pain, positive tenderness to palpation over lumbar paraspinal muscles  Findings correlate with prominent lumbar facet arthropathy seen throughout axial low back on x-ray  Currently he is neurologically intact without gait instability, saddle anesthesia or bowel/bladder abnormality  Risks, benefits alternative to medial branch blocks and subsequent radiofrequency ablation of successful thoroughly discussed with patient  Handouts provided questions answered to patient satisfaction  Plan  -ILESI L4-L5; f/u 2 weeks post procedure  -gabapentin 300 mg t i d  Ordered for patient; counseled regarding sedative effects of taking this medication and provided up titration calendar  Counseled not to take medication while driving or operating heavy machinery/using stairs  -continue home exercises and physical therapy for lumbar radiculopathy; Physician directed home exercise plan as per [facility] demonstrated and handouts provided that patient plans to participate with for 1 hour, twice a week for the next 6 weeks       There are risks associated with opioid medications, including dependence, addiction and tolerance  The patient understands and agrees to use these medications only as prescribed  Potential side effects of the medications include, but are not limited to, constipation, drowsiness, addiction, impaired judgment and risk of fatal overdose if not taken as prescribed  The patient was warned against driving while taking sedation medications or operating heavy machinery  The patient voiced understanding  Sharing medications is a felony  At this point in time, the patient is showing no signs of addiction, abuse, diversion or suicidal ideation  [place] Prescription Drug Monitoring Program report was reviewed and was appropriate      Complete risks and benefits including bleeding, infection, tissue reaction, nerve injury and allergic reaction were discussed  The approach was demonstrated using models and literature was provided  Verbal and written consent was obtained  My impressions and treatment recommendations were discussed in detail with the patient who verbalized understanding and had no further questions  Discharge instructions were provided  I personally saw and examined the patient and I agree with the above discussed plan of care  No orders of the defined types were placed in this encounter  History of Present Illness    Greater than 80% relief of pain with improved ability to participate with IADLs after bilateral L3, L4, L5 medial branch blocks #1 and #2 and ablation  proximal radicular features of L4 radiculopathy as greater than 80% relief from recent ILESI at L4-L5  Has not utilized gabapentin for pain control since having prescribed it due to concerns for further liver injury  Denies bowel/bladder issues, saddle anesthesia or gait insability   Previously reported the following symptomatology:     Dex Carter is a 75 y o  male  With pmhx of lyme disease, urinary stones/frequency presenting with a past medical history of chronic low back pain described primarily as arthritic in nature  He describes 8/10 low back pain that is worse in the mornings and worse at the end of the day  The pain is characterized by achy, nagging, indolent, crampy, stabbing pain in his axial low back  The patient describes that the pain is worse with standing for long periods of time on hard surfaces as well as with walking  The patient is a very active individual and feels as though this pain compromises his participation with independent activities of daily living  The pain can be debilitating at times and contribute to significant disability, compromising overall activity and independent activities of daily living  He has tried physical therapy with good relief of symptoms  Medications the patient has tried in the past include nsaids  He describes no radicular symptoms and has good strength  He denies any weakness numbness or paresthesias  The patient denies any saddle anesthesia, gait abnormality or incontinence  I have personally reviewed and/or updated the patient's past medical history, past surgical history, family history, social history, current medications, allergies, and vital signs today  Review of Systems   Constitutional: Positive for activity change  HENT: Negative  Eyes: Negative  Respiratory: Negative  Cardiovascular: Negative  Gastrointestinal: Negative  Endocrine: Negative  Genitourinary: Negative  Musculoskeletal: Positive for arthralgias, back pain, gait problem and myalgias  Skin: Negative  Allergic/Immunologic: Negative  Neurological: Negative for weakness and numbness  Hematological: Negative  Psychiatric/Behavioral: Negative  All other systems reviewed and are negative        Patient Active Problem List   Diagnosis   • Calculus of kidney   • Benign prostatic hyperplasia without lower urinary tract symptoms   • Elevated PSA   • Chronic bilateral low back pain without sciatica   • Lumbar spondylosis - Bilateral   • Lumbar radiculopathy Past Medical History:   Diagnosis Date   • COVID    • Kidney stone    • Lyme disease    • Urinary frequency        Past Surgical History:   Procedure Laterality Date   • CARPAL TUNNEL RELEASE     • COLONOSCOPY     • CYSTOSCOPY W/ STONE MANIPULATION     • EPIDURAL BLOCK INJECTION N/A 12/13/2022    Procedure: BLOCK / INJECTION EPIDURAL STEROID LUMBAR L4-L5 or alternate level;  Surgeon: Annabella Tavares MD;  [facility];  Service: Pain Management    • FL GUIDED NEEDLE PLAC BX/ASP/INJ  9/15/2022   • FL GUIDED NEEDLE PLAC BX/ASP/INJ  9/29/2022   • NERVE BLOCK Bilateral 9/15/2022    Procedure: BLOCK MEDIAL BRANCH L3, L4, L5;  Surgeon: Annabella Tavares MD;  [facility];  Service: Pain Management    • NERVE BLOCK Bilateral 9/29/2022    Procedure: BLOCK MEDIAL BRANCH L3, L4, L5 #2;  Surgeon: Annabella Tavares MD;  [facility];  Service: Pain Management    • RHIZOTOMY Bilateral 10/25/2022    Procedure: RHIZOTOMY LUMBAR L3, L4, L5 medial branch nerves;  Surgeon: Annabella Tavares MD;  [facility];  Service: Pain Management        Family History   Problem Relation Age of Onset   • Diabetes Father    • Heart disease Father    • Diabetes Brother        Social History     Occupational History   • Occupation: Mechanical Tech  Tobacco Use   • Smoking status: Never   • Smokeless tobacco: Never   Vaping Use   • Vaping Use: Never used   Substance and Sexual Activity   • Alcohol use: Yes     Alcohol/week: 2 0 - 4 0 standard drinks     Types: 2 - 4 Cans of beer per week   • Drug use: No   • Sexual activity: Not on file       No current facility-administered medications on file prior to encounter       Current Outpatient Medications on File Prior to Encounter   Medication Sig   • Ascorbic Acid (VITAMIN C PO) 500 mg   • cholecalciferol (VITAMIN D3) 1,000 units tablet Take 1,000 Units by mouth daily   • hydrochlorothiazide (HYDRODIURIL) 25 mg tablet 25 mg   • Saw Palmetto, Serenoa repens, (SAW PALMETTO PO) Take by mouth   • Zinc Sulfate (ZINC 15 PO) Take by mouth   • gabapentin (NEURONTIN) 300 mg capsule Take 1 capsule (300 mg total) by mouth 3 (three) times a day (Patient not taking: Reported on 12/9/2022)       Allergies   Allergen Reactions   • Tamsulosin      Joint pain with alfuzosin also  Physical Exam    /74   Pulse 73   Temp 98 °F (36 7 °C) (Oral)   Resp 18   Ht 5' 11" (1 803 m)   Wt 107 kg (235 lb)   SpO2 96%   BMI 32 78 kg/m²     Constitutional: normal, well developed, well nourished, alert, in no distress and non-toxic and no overt pain behavior  and obese  Eyes: anicteric  HEENT: grossly intact  Neck: supple, symmetric, trachea midline and no masses   Pulmonary:even and unlabored  Cardiovascular:No edema or pitting edema present  Skin:Normal without rashes or lesions and well hydrated  Psychiatric:Mood and affect appropriate  Neurologic:Cranial Nerves II-XII grossly intact Sensation grossly intact; no clonus negative barry's  Reflexes 2+ and brisk  SLR negative bilaterally  Spurling's maneuver negative bilaterally  Musculoskeletal:normal gait  5/5 strength bilaterally with AROM in all extremities  Normal heel toe and tip toe walking  Significant pain with lumbar facet loading bilaterally and with lateral spine rotation  ttp over lumbar paraspinal muscles  Negative edis's test, negative gaenslen's negative SIJ loading bilaterally  Imaging    LUMBAR SPINE     INDICATION:   M54 50: Low back pain, unspecified  G89 29: Other chronic pain      COMPARISON:  None     VIEWS:  XR SPINE LUMBAR 2 OR 3 VIEWS INJURY        FINDINGS:     There are 5 non rib bearing lumbar vertebral bodies       There is no evidence of acute fracture or destructive osseous lesion  Note is made of rudimentary ribs at L1      Alignment is unremarkable       Facet joint degenerative arthropathy at L4-5 and L5-S1    Mild multilevel degenerative disc disease with endplate sclerosis and anterior osteophytes      The pedicles appear intact      Soft tissues are unremarkable      IMPRESSION:     No acute osseous abnormality        Mild multilevel degenerative changes

## 2023-03-14 NOTE — OP NOTE
OPERATIVE REPORT  PATIENT NAME: Trina Gee    :  1955  MRN: [id number]  Pt Location:  GI ROOM 01    SURGERY DATE: 3/14/2023    Surgeon(s) and Role:      Judith Marin MD - Primary    Preop Diagnosis:  Lumbar radiculopathy [M54 16]    Post-Op Diagnosis Codes:     * Lumbar radiculopathy [M54 16]    Procedure(s):  L4-L5 ILESI    Specimen(s):  * No specimens in log *    Estimated Blood Loss:   Minimal    Drains:  * No LDAs found *    Anesthesia Type:   Local    Operative Indications:  Lumbar radiculopathy [M54 16]    Operative Findings:  L4-L5 epidurogram    Complications:   None    Procedure and Technique:  Please see detailed procedure note    I was present for the entire procedure    Patient Disposition:  PACU     SIGNATURE: Elena Jones MD  DATE: 2023  TIME: 8:49 AM

## 2023-03-14 NOTE — PROCEDURES
Pre-procedure Diagnosis:       Lumbar Radiculopathy  Post-procedure Diagnosis:     Lumbar Radiculopathy  Procedure Title(s):                  1  [L4-L5] interlaminar epidural steroid injection                                                  2  Intraoperative fluoroscopy  Attending Surgeon:                 Jared Blackman MD  Anesthesia:                             Local      Indications: The patient is a  75y o  year-old male  with a diagnosis of Lumbar Radiculopathy  The patient's history and physical exam were reviewed  The risks, benefits and alternatives to the procedure were discussed, and all questions were answered to the patient's satisfaction  The patient agreed to proceed, and written informed consent was obtained  Procedure in Detail: The patient was brought into the procedure room and placed in the prone position on the fluoroscopy table  The area of the lumbar spine was prepped with chlorhexidine gluconate solution times one and draped in a sterile manner  The [L4-L5] interspace was identified and marked under AP fluoroscopy  The skin and subcutaneous tissues in the area were anesthetized with 1% lidocaine  A 18-gauge Tuohy epidural needle was directed toward the interspace under fluoroscopic guidance until the ligamentum flavum was engaged  From this point, a loss of resistance technique with saline was used to identify entrance of the needle into the epidural space  Once an appropriate loss was obtained, negative aspiration was confirmed, and 1 ml Omnipaque 240 contrast solution was injected  An appropriate epidurogram was noted  Then, after negative aspiration, a solution consisting of 1-mL depo-medrol (80mg/mL) and 3-mL preservative-free saline was easily injected  The needle was removed with a 1% lidocaine flush  The patient's back was cleaned and a bandage was placed over the site of needle insertion       Disposition: The patient tolerated the procedure well, and there were no apparent complications  The patient was taken to the recovery area where written discharge instructions for the procedure were given        Estimated Blood Loss: None  Specimens Obtained: N/A

## 2023-03-21 ENCOUNTER — TELEPHONE (OUTPATIENT)
Dept: PAIN MEDICINE | Facility: CLINIC | Age: 68
End: 2023-03-21

## 2023-04-27 ENCOUNTER — TELEPHONE (OUTPATIENT)
Dept: OBGYN CLINIC | Facility: MEDICAL CENTER | Age: 68
End: 2023-04-27

## 2023-04-27 NOTE — TELEPHONE ENCOUNTER
Caller: Renee Lynn-    Doctor: Judy Montes    Reason for call: Needs the script for aqua therapy to process for authorization    Call back#: [phone]    Fax# [phone]

## 2023-05-02 ENCOUNTER — TELEPHONE (OUTPATIENT)
Dept: UROLOGY | Facility: MEDICAL CENTER | Age: 68
End: 2023-05-02

## 2023-05-02 DIAGNOSIS — N20.0 CALCULUS OF KIDNEY: Primary | ICD-10-CM

## 2023-05-02 NOTE — TELEPHONE ENCOUNTER
They discover in 2019 that he has a kidney stone in his left kidney but decided not to do anything since it has not bother the patient  However Patient has been having pain in her back that radiaus to his left side he is not sure if it is related to the stone  Pain started suddenly he was seen by PCP and Chiropractic but no relief  Please call patient's wife back to advise

## 2023-05-02 NOTE — TELEPHONE ENCOUNTER
The patient's wife called to inform that she had an appointment for a CT scan for the next week  The patient is in a lot of pain which he would like some help to move the study to sooner  If necessary he  can go to any hospital that is available in [place]

## 2023-05-02 NOTE — TELEPHONE ENCOUNTER
I spoke with the patient and provided him with this information  I provided him with central scheduling number to call and schedule this

## 2023-05-03 ENCOUNTER — HOSPITAL ENCOUNTER (OUTPATIENT)
Dept: CT IMAGING | Facility: HOSPITAL | Age: 68
Discharge: HOME | End: 2023-05-03

## 2023-05-03 DIAGNOSIS — N20.0 CALCULUS OF KIDNEY: ICD-10-CM

## 2023-05-03 NOTE — TELEPHONE ENCOUNTER
I spoke with the patient and let him know this was ordered STAT  Advised him to call central scheduling to reschedule this sooner

## 2023-05-04 ENCOUNTER — TELEPHONE (OUTPATIENT)
Dept: UROLOGY | Facility: AMBULATORY SURGERY CENTER | Age: 68
End: 2023-05-04

## 2023-05-04 ENCOUNTER — TELEPHONE (OUTPATIENT)
Dept: UROLOGY | Facility: MEDICAL CENTER | Age: 68
End: 2023-05-04

## 2023-05-04 NOTE — TELEPHONE ENCOUNTER
Call placed- Spoke to patient, informed him of CT results as per Blayne's notes  ----- Message from JAMES Gutierrez sent at 5/4/2023  9:24 AM EDT -----  Florence Romeo to notify patient of CT study with no identification of stones, hydronephrosis or other urologic findings  He will need to follow-up with his PCP to discuss other causes for his discomfort at this time

## 2023-05-04 NOTE — TELEPHONE ENCOUNTER
Radiology Test Results - Radiology Calling with report update    Pt under care of: Promise Camarena    Imaging Completed: 5/3/23    Significant Findings - Please review     Routed to Provider

## 2023-05-04 NOTE — TELEPHONE ENCOUNTER
Called pt, got voicemail: left message stating we will fax CT report to his pcp on file: Rosa bernardo in [employer]

## 2023-06-27 ENCOUNTER — TELEPHONE (OUTPATIENT)
Dept: UROLOGY | Facility: AMBULATORY SURGERY CENTER | Age: 68
End: 2023-06-27

## 2023-06-27 NOTE — TELEPHONE ENCOUNTER
Patients wife calling in questioning if Dr Mary Abarca looked over the PET scan report that was faxed in  The report is located in Imaging tab and then external imaging  Patients PCP would like this report read by Dr Coleman  Please have physician review and call patient back with follow up       CB: [phone]

## 2023-06-27 NOTE — TELEPHONE ENCOUNTER
Either with another MD or as soon as Dr Juancarlos Butt gets back   I don;t want this prolonged further than 3-4 weeks

## 2023-06-28 NOTE — TELEPHONE ENCOUNTER
Called pt and LMOM per comm consent that Dr Regis Muro is out of the office until July 10  If they would like an appt that day to come in to discuss PET scan results to please call office back  Triager please send this encounter to Angel Sabillon to make this appt

## 2023-06-30 NOTE — TELEPHONE ENCOUNTER
Spoke to both pt and wife and advised that per Dr Emma Jacobs this is not an urgent matter and can wait until Dr Leyla Harden returns on July 10  Pt agreed to this plan  This message will be postponed until then and brought to Dr Anjelica Polo attention when he returns

## 2023-07-13 ENCOUNTER — TELEPHONE (OUTPATIENT)
Dept: UROLOGY | Facility: MEDICAL CENTER | Age: 68
End: 2023-07-13

## 2023-07-13 NOTE — TELEPHONE ENCOUNTER
We discussed his pet scan results - uptake in prostate. PSA 3.0, was 4.1 in 2019. We discussed option of MRI of prostate, he is comfortable not doing this now.   Discuss further at [address] [place] [address]

## 2023-10-16 NOTE — PROGRESS NOTES
[facility] ORTHOPEDIC SPINE SURGERY  Nathan Packer MD  [address] [place] [address]  [phone]    HISTORY OF PRESENT ILLNESS:    Jenni Champion is a 70 y.o. male who presents for follow-up of lumbar spine. Patient was last seen in the office on 4/18/2023 where natural history of spondylolisthesis was discussed and patient was advised to attend aqua therapy and receive injections as needed. Today patient reports he is having pain in the right side of low back/buttocks. Patient states there are good days and bad days. Patient was taking meloxicam for pain, which really helped. Patient reports he recently took three weeks off of doing aqua therapy for vacation, but was doing the exercises in the swimming pool. Patient states he has not received an injection since last visit. Patient states he does receive some relief from aqua therapy, but pain is not fully relieved. ALLERGIES:   Allergies   Allergen Reactions    Tamsulosin      Joint pain with alfuzosin also.        MEDICATIONS:    Current Outpatient Medications:     Ascorbic Acid (VITAMIN C PO), 500 mg, Disp: , Rfl:     atorvastatin (LIPITOR) 20 mg tablet, Take 20 mg by mouth daily, Disp: , Rfl:     cholecalciferol (VITAMIN D3) 1,000 units tablet, Take 1,000 Units by mouth daily, Disp: , Rfl:     ezetimibe (ZETIA) 10 mg tablet, Take 10 mg by mouth daily, Disp: , Rfl:     furosemide (LASIX) 20 mg tablet, Take 20 mg by mouth daily as needed, Disp: , Rfl:     gabapentin (NEURONTIN) 300 mg capsule, Take 1 capsule (300 mg total) by mouth 3 (three) times a day (Patient not taking: Reported on 12/9/2022), Disp: 90 capsule, Rfl: 0    hydrochlorothiazide (HYDRODIURIL) 25 mg tablet, 25 mg (Patient not taking: Reported on 4/18/2023), Disp: , Rfl:     Saw Palmetto, Serenoa repens, (SAW PALMETTO PO), Take by mouth, Disp: , Rfl:     Zinc Sulfate (ZINC 15 PO), Take by mouth, Disp: , Rfl:      PAST MEDICAL HISTORY:   Past Medical History:   Diagnosis Date COVID     Kidney stone     Lyme disease     Urinary frequency        PAST SURGICAL HISTORY:  Past Surgical History:   Procedure Laterality Date    CARPAL TUNNEL RELEASE      COLONOSCOPY      CYSTOSCOPY W/ STONE MANIPULATION      EPIDURAL BLOCK INJECTION N/A 12/13/2022    Procedure: BLOCK / INJECTION EPIDURAL STEROID LUMBAR L4-L5 or alternate level;  Surgeon: Agatha Suoza MD;  [facility];  Service: Pain Management     EPIDURAL BLOCK INJECTION N/A 3/14/2023    Procedure: L4-L5 ILESI;  Surgeon: Agatha Souza MD;  [facility];  Service: Pain Management     FL GUIDED NEEDLE PLAC BX/ASP/INJ  9/15/2022    FL GUIDED NEEDLE PLAC BX/ASP/INJ  9/29/2022    NERVE BLOCK Bilateral 9/15/2022    Procedure: BLOCK MEDIAL BRANCH L3, L4, L5;  Surgeon: Agatha Souza MD;  [facility];  Service: Pain Management     NERVE BLOCK Bilateral 9/29/2022    Procedure: BLOCK MEDIAL BRANCH L3, L4, L5 #2;  Surgeon: Agatha Souza MD;  [facility];  Service: Pain Management     RHIZOTOMY Bilateral 10/25/2022    Procedure: RHIZOTOMY LUMBAR L3, L4, L5 medial branch nerves;  Surgeon: Agatha Souza MD;  [facility];  Service: Pain Management        SOCIAL HISTORY:  Social History     Tobacco Use   Smoking Status Never   Smokeless Tobacco Never          PHYSICAL EXAM:  70 y.o. male sitting comfortably on exam chair in no apparent distress. Ambulates leaning forward with normal gait  Loss of lumbar lordosis  Able to go up on toes and heels   Able to balance on one leg  No TTP over thoracic or lumbar spine. 5/5 motor strength with normal sensation in bilateral lower extremities     RADIOGRAPHIC STUDIES:  Xrays, lumbar spine, 8/11/2022: Moderate to severe multilevel lumbar degenerative disc disease at L2-3, L3-4, L4-5 and L5-S1. Grade 1 degenerative spondylolisthesis at L4-5. MRI, lumbar spine, 9/06/2022: Moderate multilevel lumbar degenerative disc disease and facet arthrosis.   Degenerative changes are present at L2-3 through L5-S1. Hint of spondylolisthesis present at L4-5 surgery with facet hypertrophy. There is evidence of mild lateral recess stenosis at L2-3 L3-4 and L5-S1. Moderate to severe lateral recess stenosis present at L4-5. ASSESSMENT:  1. Chronic left-sided low back pain without sciatica    2. Spondylolisthesis of lumbar region    3. DDD (degenerative disc disease), lumbar    4. Spinal stenosis of lumbar region without neurogenic claudication        PLAN:  70 y.o. male with chronic left sided low back pain, lumbar DDD, L4-5 spondylolisthesis, and lumbar spinal stenosis. It was discussed that patient may continue with conservative management in the form of aqua therapy and injections as needed to manage symptoms. If patient can no longer live with his symptoms, then surgical intervention will be indicated. It was discussed that patient decides when to have surgery. If it comes to surgery it will involve a laminectomy and fusion. Patient may follow-up in 6 months for re-evaluation or sooner if needed.         Scribe Attestation      I,:  Mimi Turner PA-C am acting as a scribe while in the presence of the attending physician.:       I,:  Jordan Bright MD personally performed the services described in this documentation    as scribed in my presence.:

## 2023-10-17 ENCOUNTER — OFFICE VISIT (OUTPATIENT)
Dept: OBGYN CLINIC | Facility: CLINIC | Age: 68
End: 2023-10-17
Payer: COMMERCIAL

## 2023-10-17 VITALS
HEIGHT: 71 IN | BODY MASS INDEX: 32.7 KG/M2 | WEIGHT: 233.6 LBS | SYSTOLIC BLOOD PRESSURE: 132 MMHG | DIASTOLIC BLOOD PRESSURE: 82 MMHG

## 2023-10-17 DIAGNOSIS — G89.29 CHRONIC LEFT-SIDED LOW BACK PAIN WITHOUT SCIATICA: Primary | ICD-10-CM

## 2023-10-17 DIAGNOSIS — M51.36 DDD (DEGENERATIVE DISC DISEASE), LUMBAR: ICD-10-CM

## 2023-10-17 DIAGNOSIS — M43.16 SPONDYLOLISTHESIS OF LUMBAR REGION: ICD-10-CM

## 2023-10-17 DIAGNOSIS — M54.50 CHRONIC LEFT-SIDED LOW BACK PAIN WITHOUT SCIATICA: Primary | ICD-10-CM

## 2023-10-17 DIAGNOSIS — M48.061 SPINAL STENOSIS OF LUMBAR REGION WITHOUT NEUROGENIC CLAUDICATION: ICD-10-CM

## 2023-10-17 PROCEDURE — 99213 OFFICE O/P EST LOW 20 MIN: CPT | Performed by: ORTHOPAEDIC SURGERY

## 2023-10-20 ENCOUNTER — TELEPHONE (OUTPATIENT)
Age: 68
End: 2023-10-20

## 2023-10-20 DIAGNOSIS — N40.0 BENIGN PROSTATIC HYPERPLASIA WITHOUT LOWER URINARY TRACT SYMPTOMS: ICD-10-CM

## 2023-10-20 DIAGNOSIS — N20.0 CALCULUS OF KIDNEY: Primary | ICD-10-CM

## 2023-10-20 NOTE — TELEPHONE ENCOUNTER
Pt managed by , pt scheduled for 02/12/24 asking if psa and any other testing needed mail orders to home address.

## 2024-02-06 ENCOUNTER — PREP FOR PROCEDURE (OUTPATIENT)
Dept: PAIN MEDICINE | Facility: CLINIC | Age: 69
End: 2024-02-06

## 2024-02-06 ENCOUNTER — TELEPHONE (OUTPATIENT)
Age: 69
End: 2024-02-06

## 2024-02-06 DIAGNOSIS — M54.16 LUMBAR RADICULOPATHY: Primary | ICD-10-CM

## 2024-02-06 NOTE — TELEPHONE ENCOUNTER
Spoke to patient and he is scheduled for 3/14/24 due to leaving for vacation the following Tuesday, he wanted injection closer to that.

## 2024-02-06 NOTE — TELEPHONE ENCOUNTER
S/w pt. Last OVS 12/28/22. Last procedure 3/14/23 provided 50-60% relief for a few weeks.     Per pt,  pain is the same as last year, left lower back and buttocks but not as bad as it used to be. Pt is requesting repeat injection.  Please advise

## 2024-02-06 NOTE — TELEPHONE ENCOUNTER
Joe Snow MD  You; Lorna Gallardo minutes ago (10:11 AM)       Please schedule for ILESI L4-L5 or alt level thanks

## 2024-02-06 NOTE — TELEPHONE ENCOUNTER
Caller: paul Rosales    Doctor: Edy    Reason for call: pt would like schedule a repeat injection.  Pain is 3-4/10 when walking and being active.    Pt has been doing PT, accupunture, chiropractor and orthopedics with minimal improvements.    Pt is going on vacation the end of March and would like to get injection before his vacation    Call back#: [phone]

## 2024-02-12 ENCOUNTER — PREP FOR PROCEDURE (OUTPATIENT)
Dept: PAIN MEDICINE | Facility: CLINIC | Age: 69
End: 2024-02-12

## 2024-02-12 ENCOUNTER — OFFICE VISIT (OUTPATIENT)
Dept: UROLOGY | Facility: MEDICAL CENTER | Age: 69
End: 2024-02-12
Payer: COMMERCIAL

## 2024-02-12 VITALS
SYSTOLIC BLOOD PRESSURE: 130 MMHG | HEIGHT: 71 IN | BODY MASS INDEX: 32.48 KG/M2 | OXYGEN SATURATION: 96 % | HEART RATE: 59 BPM | DIASTOLIC BLOOD PRESSURE: 80 MMHG | WEIGHT: 232 LBS

## 2024-02-12 DIAGNOSIS — R97.20 ELEVATED PROSTATE SPECIFIC ANTIGEN (PSA): ICD-10-CM

## 2024-02-12 DIAGNOSIS — N20.0 CALCULUS OF KIDNEY: ICD-10-CM

## 2024-02-12 DIAGNOSIS — M54.16 LUMBAR RADICULOPATHY: Primary | ICD-10-CM

## 2024-02-12 DIAGNOSIS — N40.0 BENIGN PROSTATIC HYPERPLASIA WITHOUT LOWER URINARY TRACT SYMPTOMS: Primary | ICD-10-CM

## 2024-02-12 LAB
SL AMB  POCT GLUCOSE, UA: ABNORMAL
SL AMB LEUKOCYTE ESTERASE,UA: ABNORMAL
SL AMB POCT BILIRUBIN,UA: ABNORMAL
SL AMB POCT BLOOD,UA: ABNORMAL
SL AMB POCT CLARITY,UA: CLEAR
SL AMB POCT COLOR,UA: YELLOW
SL AMB POCT KETONES,UA: ABNORMAL
SL AMB POCT NITRITE,UA: ABNORMAL
SL AMB POCT PH,UA: 6
SL AMB POCT SPECIFIC GRAVITY,UA: 1.02
SL AMB POCT URINE PROTEIN: ABNORMAL
SL AMB POCT UROBILINOGEN: 0.2

## 2024-02-12 PROCEDURE — 81003 URINALYSIS AUTO W/O SCOPE: CPT | Performed by: UROLOGY

## 2024-02-12 PROCEDURE — 99214 OFFICE O/P EST MOD 30 MIN: CPT | Performed by: UROLOGY

## 2024-02-12 NOTE — PROGRESS NOTES
"   HISTORY:    Follow-up:    1.  Variable PSA:    4.4 in Jan 2024  3.0 in 2022  4.1 in 2019    2.  BPH with increasing frequency urgency, occasional urge incontinence a few drops.  Nocturia x 1-2  He takes saw palmetto, he thinks maybe it helps    Cystoscopy in 2019 showed mild prostate enlargement.         ASSESSMENT / PLAN:    1.  PSA up and down in the top normal range, certainly acceptable for his age.    2.  Check PSA in 6 months with phone report    3.  Follow-up in 1 year    The following portions of the patient's history were reviewed and updated as appropriate: allergies, current medications, past family history, past medical history, past social history, past surgical history, and problem list.    Review of Systems      Objective:     Physical Exam  Genitourinary:     Comments: Penis testes normal    Prostate mildly enlarged no nodules or changes          0   Lab Value Date/Time    PSA 3.0 12/16/2022 0756    PSA 3.5 11/24/2021 0725    PSA 4.1 (H) 03/19/2019 0741   ]  No results found for: \"BUN\"  No results found for: \"CREATININE\"  No components found for: \"CBC\"      Patient Active Problem List   Diagnosis    Calculus of kidney    Benign prostatic hyperplasia without lower urinary tract symptoms    Elevated PSA    Chronic bilateral low back pain without sciatica    Lumbar spondylosis - Bilateral    Lumbar radiculopathy        Diagnoses and all orders for this visit:    Benign prostatic hyperplasia without lower urinary tract symptoms  -     POCT urine dip auto non-scope    Calculus of kidney  -     POCT urine dip auto non-scope    Elevated prostate specific antigen (PSA)  -     PSA Total, Diagnostic; Future           Patient ID: Bobby Kearns is a 68 y.o. male.      Current Outpatient Medications:     Ascorbic Acid (VITAMIN C PO), 500 mg, Disp: , Rfl:     atorvastatin (LIPITOR) 20 mg tablet, Take 20 mg by mouth daily, Disp: , Rfl:     cholecalciferol (VITAMIN D3) 1,000 units tablet, Take 1,000 Units by mouth " daily, Disp: , Rfl:     ezetimibe (ZETIA) 10 mg tablet, Take 10 mg by mouth daily, Disp: , Rfl:     MELOXICAM PO, Take by mouth, Disp: , Rfl:     Saw Palmetto, Serenoa repens, (SAW PALMETTO PO), Take by mouth, Disp: , Rfl:     Zinc Sulfate (ZINC 15 PO), Take by mouth, Disp: , Rfl:     furosemide (LASIX) 20 mg tablet, Take 20 mg by mouth daily as needed (Patient not taking: Reported on 2/12/2024), Disp: , Rfl:     hydrochlorothiazide (HYDRODIURIL) 25 mg tablet, 25 mg (Patient not taking: Reported on 4/18/2023), Disp: , Rfl:     Past Medical History:   Diagnosis Date    COVID     Kidney stone     Lyme disease     Urinary frequency        Past Surgical History:   Procedure Laterality Date    CARPAL TUNNEL RELEASE      COLONOSCOPY      CYSTOSCOPY W/ STONE MANIPULATION      EPIDURAL BLOCK INJECTION N/A 12/13/2022    Procedure: BLOCK / INJECTION EPIDURAL STEROID LUMBAR L4-L5 or alternate level;  Surgeon: Joe Snow MD;  Location: [facility];  Service: Pain Management     EPIDURAL BLOCK INJECTION N/A 3/14/2023    Procedure: L4-L5 ILESI;  Surgeon: Joe Snow MD;  Location: [facility];  Service: Pain Management     FL GUIDED NEEDLE PLAC BX/ASP/INJ  9/15/2022    FL GUIDED NEEDLE PLAC BX/ASP/INJ  9/29/2022    NERVE BLOCK Bilateral 9/15/2022    Procedure: BLOCK MEDIAL BRANCH L3, L4, L5;  Surgeon: Joe Snow MD;  Location: [facility];  Service: Pain Management     NERVE BLOCK Bilateral 9/29/2022    Procedure: BLOCK MEDIAL BRANCH L3, L4, L5 #2;  Surgeon: Joe Snow MD;  Location: [facility];  Service: Pain Management     RHIZOTOMY Bilateral 10/25/2022    Procedure: RHIZOTOMY LUMBAR L3, L4, L5 medial branch nerves;  Surgeon: Joe Snow MD;  Location: [facility];  Service: Pain Management        Social History

## 2024-03-14 ENCOUNTER — HOSPITAL ENCOUNTER (OUTPATIENT)
Dept: GASTROENTEROLOGY | Facility: HOSPITAL | Age: 69
Setting detail: OUTPATIENT SURGERY
Discharge: HOME | End: 2024-03-14
Attending: ANESTHESIOLOGY | Admitting: ANESTHESIOLOGY
Payer: COMMERCIAL

## 2024-03-14 VITALS
TEMPERATURE: 98 F | OXYGEN SATURATION: 97 % | HEART RATE: 63 BPM | DIASTOLIC BLOOD PRESSURE: 80 MMHG | HEIGHT: 71 IN | RESPIRATION RATE: 20 BRPM | SYSTOLIC BLOOD PRESSURE: 165 MMHG | WEIGHT: 225 LBS | BODY MASS INDEX: 31.5 KG/M2

## 2024-03-14 DIAGNOSIS — M54.16 LUMBAR RADICULOPATHY: ICD-10-CM

## 2024-03-14 PROCEDURE — 62323 NJX INTERLAMINAR LMBR/SAC: CPT | Performed by: ANESTHESIOLOGY

## 2024-03-14 RX ORDER — SODIUM CHLORIDE 9 MG/ML
INJECTION INTRAVENOUS AS NEEDED
Status: COMPLETED | OUTPATIENT
Start: 2024-03-14 | End: 2024-03-14

## 2024-03-14 RX ORDER — METHYLPREDNISOLONE ACETATE 80 MG/ML
INJECTION, SUSPENSION INTRA-ARTICULAR; INTRALESIONAL; INTRAMUSCULAR; SOFT TISSUE AS NEEDED
Status: COMPLETED | OUTPATIENT
Start: 2024-03-14 | End: 2024-03-14

## 2024-03-14 RX ORDER — LIDOCAINE HYDROCHLORIDE 10 MG/ML
INJECTION, SOLUTION EPIDURAL; INFILTRATION; INTRACAUDAL; PERINEURAL AS NEEDED
Status: COMPLETED | OUTPATIENT
Start: 2024-03-14 | End: 2024-03-14

## 2024-03-14 RX ADMIN — LIDOCAINE HYDROCHLORIDE 5 ML: 10 INJECTION, SOLUTION EPIDURAL; INFILTRATION; INTRACAUDAL; PERINEURAL at 08:14

## 2024-03-14 RX ADMIN — SODIUM CHLORIDE 3 ML: 9 INJECTION, SOLUTION INTRAVENOUS at 08:16

## 2024-03-14 RX ADMIN — METHYLPREDNISOLONE ACETATE 80 MG: 80 INJECTION, SUSPENSION INTRA-ARTICULAR; INTRALESIONAL; INTRAMUSCULAR; SOFT TISSUE at 08:15

## 2024-03-14 RX ADMIN — IOHEXOL 1 ML: 240 INJECTION, SOLUTION INTRATHECAL; INTRAVASCULAR; INTRAVENOUS; ORAL at 08:15

## 2024-03-14 NOTE — H&P
Assessment  1. Lumbar spondylosis - Bilateral    2. Lumbar radiculopathy    Greater than 80% relief of pain with improved ability to participate with IADLs after bilateral L3, L4, L5 medial branch blocks #1 and #2 and ablation. proximal radicular features of L4 radiculopathy as greater than 80% relief from ILESI at L4-L5 3/14/23. Has not utilized gabapentin for pain control since having prescribed it due to concerns for further liver injury. Denies bowel/bladder issues, saddle anesthesia or gait insability. Previously reported the following symptomatology:     Axial low back pain described primarily by arthritic features.  Aching, nagging, indolent, stabbing, throbbing features in axial low back without radicular components.  5/5 strength bilaterally, negative SLR.  Positive facet loading maneuvers in lumbar spine elicited pain, positive tenderness to palpation over lumbar paraspinal muscles.  Findings correlate with prominent lumbar facet arthropathy seen throughout axial low back on x-ray.  Currently he is neurologically intact without gait instability, saddle anesthesia or bowel/bladder abnormality. Risks, benefits alternative to medial branch blocks and subsequent radiofrequency ablation of successful thoroughly discussed with patient.  Handouts provided questions answered to patient satisfaction.    Plan  -ILESI L4-L5; f/u 2 weeks post procedure  -gabapentin 300 mg t.i.d. Ordered for patient; counseled regarding sedative effects of taking this medication and provided up titration calendar.  Counseled not to take medication while driving or operating heavy machinery/using stairs  -continue home exercises and physical therapy for lumbar radiculopathy; Physician directed home exercise plan as per [employer] demonstrated and handouts provided that patient plans to participate with for 1 hour, twice a week for the next 6 weeks.     There are risks associated with opioid medications, including dependence, addiction and  tolerance. The patient understands and agrees to use these medications only as prescribed. Potential side effects of the medications include, but are not limited to, constipation, drowsiness, addiction, impaired judgment and risk of fatal overdose if not taken as prescribed. The patient was warned against driving while taking sedation medications or operating heavy machinery. The patient voiced understanding. Sharing medications is a felony. At this point in time, the patient is showing no signs of addiction, abuse, diversion or suicidal ideation.     [place] Prescription Drug Monitoring Program report was reviewed and was appropriate      Complete risks and benefits including bleeding, infection, tissue reaction, nerve injury and allergic reaction were discussed. The approach was demonstrated using models and literature was provided. Verbal and written consent was obtained.     My impressions and treatment recommendations were discussed in detail with the patient who verbalized understanding and had no further questions.  Discharge instructions were provided. I personally saw and examined the patient and I agree with the above discussed plan of care.    No orders of the defined types were placed in this encounter.    History of Present Illness    Greater than 80% relief of pain with improved ability to participate with IADLs after bilateral L3, L4, L5 medial branch blocks #1 and #2 and ablation. proximal radicular features of L4 radiculopathy as greater than 80% relief from ILESI at L4-L5 3/14/23. Has not utilized gabapentin for pain control since having prescribed it due to concerns for further liver injury. Denies bowel/bladder issues, saddle anesthesia or gait insability. Previously reported the following symptomatology:     Bobby Kearns is a 68 y.o. male  With pmhx of lyme disease, urinary stones/frequency presenting with a past medical history of chronic low back pain described primarily as arthritic in  nature.  He describes 8/10 low back pain that is worse in the mornings and worse at the end of the day.  The pain is characterized by achy, nagging, indolent, crampy, stabbing pain in his axial low back.  The patient describes that the pain is worse with standing for long periods of time on hard surfaces as well as with walking.  The patient is a very active individual and feels as though this pain compromises his participation with independent activities of daily living. The pain can be debilitating at times and contribute to significant disability, compromising overall activity and independent activities of daily living.  He has tried physical therapy with good relief of symptoms.  Medications the patient has tried in the past include nsaids.  He describes no radicular symptoms and has good strength.  He denies any weakness numbness or paresthesias.  The patient denies any saddle anesthesia, gait abnormality or incontinence.      I have personally reviewed and/or updated the patient's past medical history, past surgical history, family history, social history, current medications, allergies, and vital signs today.     Review of Systems   Constitutional:  Positive for activity change.   HENT: Negative.     Eyes: Negative.    Respiratory: Negative.     Cardiovascular: Negative.    Gastrointestinal: Negative.    Endocrine: Negative.    Genitourinary: Negative.    Musculoskeletal:  Positive for arthralgias, back pain, gait problem and myalgias.   Skin: Negative.    Allergic/Immunologic: Negative.    Neurological:  Negative for weakness and numbness.   Hematological: Negative.    Psychiatric/Behavioral: Negative.     All other systems reviewed and are negative.      Patient Active Problem List   Diagnosis    Calculus of kidney    Benign prostatic hyperplasia without lower urinary tract symptoms    Elevated PSA    Chronic bilateral low back pain without sciatica    Lumbar spondylosis - Bilateral    Lumbar radiculopathy        Past Medical History:   Diagnosis Date    COVID     Kidney stone     Lyme disease     Urinary frequency        Past Surgical History:   Procedure Laterality Date    CARPAL TUNNEL RELEASE      COLONOSCOPY      CYSTOSCOPY W/ STONE MANIPULATION      EPIDURAL BLOCK INJECTION N/A 12/13/2022    Procedure: BLOCK / INJECTION EPIDURAL STEROID LUMBAR L4-L5 or alternate level;  Surgeon: Joe Snow MD;  Location: [facility];  Service: Pain Management     EPIDURAL BLOCK INJECTION N/A 3/14/2023    Procedure: L4-L5 ILESI;  Surgeon: Joe Snow MD;  Location: [facility];  Service: Pain Management     FL GUIDED NEEDLE PLAC BX/ASP/INJ  9/15/2022    FL GUIDED NEEDLE PLAC BX/ASP/INJ  9/29/2022    NERVE BLOCK Bilateral 9/15/2022    Procedure: BLOCK MEDIAL BRANCH L3, L4, L5;  Surgeon: Joe Snow MD;  Location: [facility];  Service: Pain Management     NERVE BLOCK Bilateral 9/29/2022    Procedure: BLOCK MEDIAL BRANCH L3, L4, L5 #2;  Surgeon: Joe Snow MD;  Location[facility];  Service: Pain Management     RHIZOTOMY Bilateral 10/25/2022    Procedure: RHIZOTOMY LUMBAR L3, L4, L5 medial branch nerves;  Surgeon: Joe Snow MD;  Location: [facility];  Service: Pain Management        Family History   Problem Relation Age of Onset    Diabetes Father     Heart disease Father     Diabetes Brother        Social History     Occupational History    Occupation: Mechanical Tech.   Tobacco Use    Smoking status: Never    Smokeless tobacco: Never   Vaping Use    Vaping status: Never Used   Substance and Sexual Activity    Alcohol use: Yes     Alcohol/week: 2.0 - 4.0 standard drinks of alcohol     Types: 2 - 4 Cans of beer per week    Drug use: No    Sexual activity: Not on file       Current Outpatient Medications on File Prior to Encounter   Medication Sig    Ascorbic Acid (VITAMIN C PO) 500 mg    atorvastatin (LIPITOR) 20 mg tablet Take 20 mg by mouth daily    cholecalciferol (VITAMIN D3) 1,000 units tablet Take 1,000 Units  "by mouth daily    ezetimibe (ZETIA) 10 mg tablet Take 10 mg by mouth daily    MELOXICAM PO Take by mouth    Saw Palmetto, Serenoa repens, (SAW PALMETTO PO) Take by mouth    Zinc Sulfate (ZINC 15 PO) Take by mouth    furosemide (LASIX) 20 mg tablet Take 20 mg by mouth daily as needed (Patient not taking: Reported on 2/12/2024)    hydrochlorothiazide (HYDRODIURIL) 25 mg tablet 25 mg (Patient not taking: Reported on 4/18/2023)     No current facility-administered medications on file prior to encounter.       Allergies   Allergen Reactions    Tamsulosin      Joint pain with alfuzosin also.         Physical Exam    /78   Pulse 64   Temp 97.5 °F (36.4 °C)   Resp 16   Ht 5' 11\" (1.803 m)   Wt 102 kg (225 lb)   SpO2 95%   BMI 31.38 kg/m²     Constitutional: normal, well developed, well nourished, alert, in no distress and non-toxic and no overt pain behavior. and obese  Eyes: anicteric  HEENT: grossly intact  Neck: supple, symmetric, trachea midline and no masses   Pulmonary:even and unlabored  Cardiovascular:No edema or pitting edema present  Skin:Normal without rashes or lesions and well hydrated  Psychiatric:Mood and affect appropriate  Neurologic:Cranial Nerves II-XII grossly intact Sensation grossly intact; no clonus negative barry's. Reflexes 2+ and brisk. SLR negative bilaterally. Spurling's maneuver negative bilaterally.  Musculoskeletal:normal gait. 5/5 strength bilaterally with AROM in all extremities. Normal heel toe and tip toe walking. Significant pain with lumbar facet loading bilaterally and with lateral spine rotation. ttp over lumbar paraspinal muscles. Negative edis's test, negative gaenslen's negative SIJ loading bilaterally.    Imaging    LUMBAR SPINE     INDICATION:   M54.50: Low back pain, unspecified  G89.29: Other chronic pain.     COMPARISON:  None     VIEWS:  XR SPINE LUMBAR 2 OR 3 VIEWS INJURY        FINDINGS:     There are 5 non rib bearing lumbar vertebral bodies.      There is " no evidence of acute fracture or destructive osseous lesion.  Note is made of rudimentary ribs at L1.     Alignment is unremarkable.      Facet joint degenerative arthropathy at L4-5 and L5-S1.  Mild multilevel degenerative disc disease with endplate sclerosis and anterior osteophytes.     The pedicles appear intact.     Soft tissues are unremarkable.     IMPRESSION:     No acute osseous abnormality.       Mild multilevel degenerative changes.

## 2024-03-14 NOTE — DISCHARGE INSTR - AVS FIRST PAGE
YOUR 2 WEEK FOLLOW UP HAS BEEN SCHEDULED; IF YOU WISH TO CHANGE THE FOLLOW UP, PLEASE CALL THE [facility] AT [place]: [phone]    Epidural Steroid Injection   WHAT YOU NEED TO KNOW:   An epidural steroid injection (ROEL) is a procedure to inject steroid medicine into the epidural space. The epidural space is between your spinal cord and vertebrae. Steroids reduce inflammation and fluid buildup in your spine that may be causing pain. You may be given pain medicine along with the steroids.        DISCHARGE INSTRUCTIONS:   Call your local emergency number (911 in the [place]) if:   You have a seizure.    You have trouble moving your legs.    Seek care immediately if:   Blood soaks through your bandage.    You have a fever or chills, severe back pain, and the procedure area is sensitive to the touch.    You cannot control when you urinate or have a bowel movement.    Call your doctor if:   You have weakness or numbness in your legs.    Your wound is red, swollen, or draining pus.    You have nausea or are vomiting.    Your face or neck is red and you feel warm.    You have more pain than you had before the procedure.    You have swelling in your hands or feet.    You have questions or concerns about your condition or care.    Care for your wound as directed:  You may remove the bandage before you go to bed the day of your procedure. You may take a shower, but do not take a bath for at least 24 hours.   Self-care:   Do not drive,  use machines, or do strenuous activity for 24 hours after your procedure or as directed.     Continue other treatments  as directed. Steroid injections alone will not control your pain. The injections are meant to be used with other treatments, such as physical therapy.    Follow up with your doctor as directed:  Write down your questions so you remember to ask them during your visits.     EPIDURAL STEROID INJECTION DISCHARGE INSTRUCTIONS      ACTIVITY  Do not drive or operate  machinery today.  No strenuous activity today - bending, lifting, etc.   You may resume normal activities starting tomorrow - start slowly and as tolerated.  You may shower today, but not tub baths or hot tubs.  You may have numbness for several hours from the local anesthetics. Please use caution and common sense, especially with weight-bearing activities.    CARE OF THE INJECTION SITE  If you have soreness or pain apply ice to the area today (20 minutes on and 20 minutes off).  Starting tomorrow, you   Notify the [facility] if you have any of the following: redness, drainage, swelling or fever above 100°F.      MEDICATIONS  Continue to take all routine medications.  Our office may have instructed you to hold some medications. You may restart medications, including blood thinners.

## 2024-03-21 ENCOUNTER — TELEPHONE (OUTPATIENT)
Dept: OBGYN CLINIC | Facility: HOSPITAL | Age: 69
End: 2024-03-21

## 2024-04-03 ENCOUNTER — OFFICE VISIT (OUTPATIENT)
Dept: PAIN MEDICINE | Facility: CLINIC | Age: 69
End: 2024-04-03
Payer: COMMERCIAL

## 2024-04-03 VITALS
OXYGEN SATURATION: 94 % | HEIGHT: 71 IN | BODY MASS INDEX: 31.5 KG/M2 | WEIGHT: 225 LBS | SYSTOLIC BLOOD PRESSURE: 140 MMHG | DIASTOLIC BLOOD PRESSURE: 80 MMHG | RESPIRATION RATE: 18 BRPM | HEART RATE: 66 BPM | TEMPERATURE: 97.9 F

## 2024-04-03 DIAGNOSIS — M46.1 SACROILIITIS (HCC): ICD-10-CM

## 2024-04-03 DIAGNOSIS — M47.816 LUMBAR SPONDYLOSIS: Primary | ICD-10-CM

## 2024-04-03 PROCEDURE — 99214 OFFICE O/P EST MOD 30 MIN: CPT | Performed by: ANESTHESIOLOGY

## 2024-04-03 NOTE — PROGRESS NOTES
Assessment  1. Lumbar spondylosis - Bilateral    2. Lumbar radiculopathy    Greater than 80% relief of pain with improved ability to participate with IADLs after bilateral L3, L4, L5 medial branch blocks #1 and #2 and ablation. proximal radicular features of L4 radiculopathy as greater than 80% relief from ILESI at L4-L5 3/14/23. This pain has subsequently returned and has not been responsive to subsequent ROEL; has elements of left SIJ pathology (+gaenslen's, jacob's SIJ loading/compression test). Has not utilized gabapentin for pain control since having prescribed it due to concerns for further liver injury. Denies bowel/bladder issues, saddle anesthesia or gait insability. Previously reported the following symptomatology:     Axial low back pain described primarily by arthritic features.  Aching, nagging, indolent, stabbing, throbbing features in axial low back without radicular components.  5/5 strength bilaterally, negative SLR.  Positive facet loading maneuvers in lumbar spine elicited pain, positive tenderness to palpation over lumbar paraspinal muscles.  Findings correlate with prominent lumbar facet arthropathy seen throughout axial low back on x-ray.  Currently he is neurologically intact without gait instability, saddle anesthesia or bowel/bladder abnormality. Risks, benefits alternative to medial branch blocks and subsequent radiofrequency ablation of successful thoroughly discussed with patient.  Handouts provided questions answered to patient satisfaction.    Plan  -MRI lumbar spine; will f/u result with patient  -gabapentin 300 mg t.i.d. Ordered for patient; has since tapered. counseled regarding sedative effects of taking this medication and provided up titration calendar.  Counseled not to take medication while driving or operating heavy machinery/using stairs  -continue home exercises and physical therapy for lumbar radiculopathy; Physician directed home exercise plan as per [facility] demonstrated  and handouts provided that patient plans to participate with for 1 hour, twice a week for the next 6 weeks.     There are risks associated with opioid medications, including dependence, addiction and tolerance. The patient understands and agrees to use these medications only as prescribed. Potential side effects of the medications include, but are not limited to, constipation, drowsiness, addiction, impaired judgment and risk of fatal overdose if not taken as prescribed. The patient was warned against driving while taking sedation medications or operating heavy machinery. The patient voiced understanding. Sharing medications is a felony. At this point in time, the patient is showing no signs of addiction, abuse, diversion or suicidal ideation.     [place] Prescription Drug Monitoring Program report was reviewed and was appropriate      Complete risks and benefits including bleeding, infection, tissue reaction, nerve injury and allergic reaction were discussed. The approach was demonstrated using models and literature was provided. Verbal and written consent was obtained.     My impressions and treatment recommendations were discussed in detail with the patient who verbalized understanding and had no further questions.  Discharge instructions were provided. I personally saw and examined the patient and I agree with the above discussed plan of care.    No orders of the defined types were placed in this encounter.    History of Present Illness    Greater than 80% relief of pain with improved ability to participate with IADLs after series of bilateral L3, L4, L5 medial branch blocks. Additionally with proximal radicular features of L4 radiculopathy as greater than 80% relief from ILESI at L4-L5 3/14/23. This pain has subsequently returned and has not been responsive to subsequent ROEL; has elements of left low back pain with arthritic features. Has not utilized gabapentin for pain control since having prescribed it  due to concerns for further liver injury. Denies bowel/bladder issues, saddle anesthesia or gait insability. Previously reported the following symptomatology:     Bobby Kearns is a 69 y.o. male  With pmhx of lyme disease, urinary stones/frequency presenting with a past medical history of chronic low back pain described primarily as arthritic in nature.  He describes 8/10 low back pain that is worse in the mornings and worse at the end of the day.  The pain is characterized by achy, nagging, indolent, crampy, stabbing pain in his axial low back.  The patient describes that the pain is worse with standing for long periods of time on hard surfaces as well as with walking.  The patient is a very active individual and feels as though this pain compromises his participation with independent activities of daily living. The pain can be debilitating at times and contribute to significant disability, compromising overall activity and independent activities of daily living.  He has tried physical therapy with good relief of symptoms.  Medications the patient has tried in the past include nsaids.  He describes no radicular symptoms and has good strength.  He denies any weakness numbness or paresthesias.  The patient denies any saddle anesthesia, gait abnormality or incontinence.      I have personally reviewed and/or updated the patient's past medical history, past surgical history, family history, social history, current medications, allergies, and vital signs today.     Review of Systems   Constitutional:  Positive for activity change.   HENT: Negative.     Eyes: Negative.    Respiratory: Negative.     Cardiovascular: Negative.    Gastrointestinal: Negative.    Endocrine: Negative.    Genitourinary: Negative.    Musculoskeletal:  Positive for arthralgias, back pain, gait problem and myalgias.   Skin: Negative.    Allergic/Immunologic: Negative.    Neurological:  Negative for weakness and numbness.   Hematological: Negative.     Psychiatric/Behavioral: Negative.     All other systems reviewed and are negative.      Patient Active Problem List   Diagnosis    Calculus of kidney    Benign prostatic hyperplasia without lower urinary tract symptoms    Elevated PSA    Chronic bilateral low back pain without sciatica    Lumbar spondylosis - Bilateral    Lumbar radiculopathy       Past Medical History:   Diagnosis Date    COVID     Kidney stone     Lyme disease     Urinary frequency        Past Surgical History:   Procedure Laterality Date    CARPAL TUNNEL RELEASE      COLONOSCOPY      CYSTOSCOPY W/ STONE MANIPULATION      EPIDURAL BLOCK INJECTION N/A 12/13/2022    Procedure: BLOCK / INJECTION EPIDURAL STEROID LUMBAR L4-L5 or alternate level;  Surgeon: Joe Snow MD;  [facility];  Service: Pain Management     EPIDURAL BLOCK INJECTION N/A 3/14/2023    Procedure: L4-L5 ILESI;  Surgeon: Joe Snow MD;  [facility]: [facility];  Service: Pain Management     FL GUIDED NEEDLE PLAC BX/ASP/INJ  9/15/2022    FL GUIDED NEEDLE PLAC BX/ASP/INJ  9/29/2022    IR SPINE AND PAIN PROCEDURE  3/14/2024    NERVE BLOCK Bilateral 9/15/2022    Procedure: BLOCK MEDIAL BRANCH L3, L4, L5;  Surgeon: Joe Snow MD;  Location: [facility];  Service: Pain Management     NERVE BLOCK Bilateral 9/29/2022    Procedure: BLOCK MEDIAL BRANCH L3, L4, L5 #2;  Surgeon: Joe Snow MD;  [facility];  Service: Pain Management     RHIZOTOMY Bilateral 10/25/2022    Procedure: RHIZOTOMY LUMBAR L3, L4, L5 medial branch nerves;  Surgeon: Joe Snow MD;  Location: [facility];  Service: Pain Management        Family History   Problem Relation Age of Onset    Diabetes Father     Heart disease Father     Diabetes Brother        Social History     Occupational History    Occupation: Mechanical Tech.   Tobacco Use    Smoking status: Never    Smokeless tobacco: Never   Vaping Use    Vaping status: Never Used   Substance and Sexual Activity    Alcohol use: Yes      "Alcohol/week: 2.0 - 4.0 standard drinks of alcohol     Types: 2 - 4 Cans of beer per week    Drug use: No    Sexual activity: Not on file       Current Outpatient Medications on File Prior to Visit   Medication Sig    Ascorbic Acid (VITAMIN C PO) 500 mg    atorvastatin (LIPITOR) 20 mg tablet Take 20 mg by mouth daily    cholecalciferol (VITAMIN D3) 1,000 units tablet Take 1,000 Units by mouth daily    ezetimibe (ZETIA) 10 mg tablet Take 10 mg by mouth daily    furosemide (LASIX) 20 mg tablet Take 20 mg by mouth daily as needed    MELOXICAM PO Take by mouth    Saw Palmetto, Serenoa repens, (SAW PALMETTO PO) Take by mouth    Zinc Sulfate (ZINC 15 PO) Take by mouth    hydrochlorothiazide (HYDRODIURIL) 25 mg tablet 25 mg (Patient not taking: Reported on 4/18/2023)     No current facility-administered medications on file prior to visit.       Allergies   Allergen Reactions    Tamsulosin      Joint pain with alfuzosin also.         Physical Exam    /80 (BP Location: Left arm, Patient Position: Sitting, Cuff Size: Large)   Pulse 66   Temp 97.9 °F (36.6 °C)   Resp 18   Ht 5' 11\" (1.803 m)   Wt 102 kg (225 lb)   SpO2 94%   BMI 31.38 kg/m²     Constitutional: normal, well developed, well nourished, alert, in no distress and non-toxic and no overt pain behavior. and obese  Eyes: anicteric  HEENT: grossly intact  Neck: supple, symmetric, trachea midline and no masses   Pulmonary:even and unlabored  Cardiovascular:No edema or pitting edema present  Skin:Normal without rashes or lesions and well hydrated  Psychiatric:Mood and affect appropriate  Neurologic:Cranial Nerves II-XII grossly intact Sensation grossly intact; no clonus negative barry's. Reflexes 2+ and brisk. SLR negative bilaterally. Spurling's maneuver negative bilaterally.  Musculoskeletal:normal gait. 5/5 strength bilaterally with AROM in all extremities. Normal heel toe and tip toe walking. Significant pain with lumbar facet loading bilaterally and " with lateral spine rotation. ttp over lumbar paraspinal muscles. (+gaenslen's, jacob's SIJ loading/compression test).    Imaging    LUMBAR SPINE     INDICATION:   M54.50: Low back pain, unspecified  G89.29: Other chronic pain.     COMPARISON:  None     VIEWS:  XR SPINE LUMBAR 2 OR 3 VIEWS INJURY        FINDINGS:     There are 5 non rib bearing lumbar vertebral bodies.      There is no evidence of acute fracture or destructive osseous lesion.  Note is made of rudimentary ribs at L1.     Alignment is unremarkable.      Facet joint degenerative arthropathy at L4-5 and L5-S1.  Mild multilevel degenerative disc disease with endplate sclerosis and anterior osteophytes.     The pedicles appear intact.     Soft tissues are unremarkable.     IMPRESSION:     No acute osseous abnormality.       Mild multilevel degenerative changes.

## 2024-04-08 NOTE — TELEPHONE ENCOUNTER
Please advise pt had a PSA 3/19 that was abnormal no f/u appt  Prostate Specific Antigen Total 0 0 - 4 0 ng/mL 4 1High 08-Apr-2024 10:36

## 2024-04-12 NOTE — PROGRESS NOTES
[facility] ORTHOPEDIC SPINE SURGERY  DR.AMIR DANIELLE MD  [address] [facility] [address]  [phone]    HISTORY OF PRESENT ILLNESS:    Bobby Kearns is a 69 y.o. male who presents s for follow-up of lumbar spine. Patient was last seen in the office on 10/17/2023 where patient was advised to continue with conservative management of symptoms including aqua therapy and spinal injections. Patient received an L4-5 interlaminar injection on 3/14/2024. Today patient reports he is having pain in the left side of low back and middle of back that worsens with walking and activity. Patient denies pain radiating down to lower extremities. Patient reports he can no longer do the things he wants to do such as playing pickle ball. Patient states he is doing at home exercises. Patient has tried acupuncture and chiropractic care with some relief. Patient did not feel much relief with aqua therapy. Patient did not receive significant relief from most recent spinal injection.       ALLERGIES:   Allergies   Allergen Reactions    Tamsulosin      Joint pain with alfuzosin also.       MEDICATIONS:    Current Outpatient Medications:     Ascorbic Acid (VITAMIN C PO), 500 mg, Disp: , Rfl:     atorvastatin (LIPITOR) 20 mg tablet, Take 20 mg by mouth daily, Disp: , Rfl:     cholecalciferol (VITAMIN D3) 1,000 units tablet, Take 1,000 Units by mouth daily, Disp: , Rfl:     ezetimibe (ZETIA) 10 mg tablet, Take 10 mg by mouth daily, Disp: , Rfl:     furosemide (LASIX) 20 mg tablet, Take 20 mg by mouth daily as needed, Disp: , Rfl:     hydrochlorothiazide (HYDRODIURIL) 25 mg tablet, 25 mg (Patient not taking: Reported on 4/18/2023), Disp: , Rfl:     MELOXICAM PO, Take by mouth, Disp: , Rfl:     Saw Palmetto, Serenoa repens, (SAW PALMETTO PO), Take by mouth, Disp: , Rfl:     Zinc Sulfate (ZINC 15 PO), Take by mouth, Disp: , Rfl:      PAST MEDICAL HISTORY:   Past Medical History:   Diagnosis Date    COVID     Kidney stone     Lyme disease      Urinary frequency        PAST SURGICAL HISTORY:  Past Surgical History:   Procedure Laterality Date    CARPAL TUNNEL RELEASE      COLONOSCOPY      CYSTOSCOPY W/ STONE MANIPULATION      EPIDURAL BLOCK INJECTION N/A 12/13/2022    Procedure: BLOCK / INJECTION EPIDURAL STEROID LUMBAR L4-L5 or alternate level;  Surgeon: Joe Snow MD;  Location: [facility];  Service: Pain Management     EPIDURAL BLOCK INJECTION N/A 3/14/2023    Procedure: L4-L5 ILESI;  Surgeon: Joe Snow MD;  [facility];  Service: Pain Management     FL GUIDED NEEDLE PLAC BX/ASP/INJ  9/15/2022    FL GUIDED NEEDLE PLAC BX/ASP/INJ  9/29/2022    IR SPINE AND PAIN PROCEDURE  3/14/2024    NERVE BLOCK Bilateral 9/15/2022    Procedure: BLOCK MEDIAL BRANCH L3, L4, L5;  Surgeon: Joe Snow MD;  [facility];  Service: Pain Management     NERVE BLOCK Bilateral 9/29/2022    Procedure: BLOCK MEDIAL BRANCH L3, L4, L5 #2;  Surgeon: Joe Snow MD;  [facility];  Service: Pain Management     RHIZOTOMY Bilateral 10/25/2022    Procedure: RHIZOTOMY LUMBAR L3, L4, L5 medial branch nerves;  Surgeon: Joe Snow MD;  [facility];  Service: Pain Management        SOCIAL HISTORY:  Social History     Tobacco Use   Smoking Status Never   Smokeless Tobacco Never          PHYSICAL EXAM:  69 y.o. male sitting comfortably on exam chair in no apparent distress.   Ambulates leaning forward with wide based gait  Loss of lumbar lordosis  Able to go up on toes and heels   Able to balance on one leg  No TTP over thoracic or lumbar spine.   5/5 motor strength with normal sensation in bilateral lower extremities   Absent patellar reflexes bilaterally       RADIOGRAPHIC STUDIES:  Xrays, lumbar spine, 8/11/2022: Moderate to severe multilevel lumbar degenerative disc disease at L2-3, L3-4, L4-5 and L5-S1.  Grade 1 degenerative spondylolisthesis at L4-5.  MRI, lumbar spine, 9/06/2022: Moderate multilevel lumbar degenerative disc disease  and facet arthrosis.  Degenerative changes are present at L2-3 through L5-S1.  Hint of spondylolisthesis present at L4-5 surgery with facet hypertrophy.  There is evidence of mild lateral recess stenosis at L2-3 L3-4 and L5-S1.  Moderate to severe lateral recess stenosis present at L4-5.  MRI, lumbar spine, 4/15/2024: Moderate to severe multilevel lumbar degenerative disc disease and moderate multilevel lumbar facet hypertrophy.  There is evidence of mild lateral recess stenosis at L2-3.  Mild to moderate lateral recess stenosis at L3-4 and severe lateral recess stenosis at L4-5.  Degenerative spondylosis is present at L4-5 there is also evidence of central disc herniation at L4-5.  Epidural lipomatosis is present at L4-5 and to a great extent at L5-S1.    ASSESSMENT:  1. Chronic midline low back pain without sciatica    2. Spondylolisthesis of lumbar region    3. DDD (degenerative disc disease), lumbar    4. Spinal stenosis of lumbar region with neurogenic claudication        PLAN:  69 y.o. male with chronic left sided low back pain, lumbar DDD, L4-5 spondylolisthesis, and lumbar spinal stenosis.      MRI of lumbar spine was reviewed in the office today. It was discussed that when it comes to surgery then the patient is the one to decide. The main indication for surgery is to improve function. If patient can no longer walk the length of a football field, then surgery may be indicated. Surgery would involve an L3-4 and L4-5 laminectomy and L4-5 lumbar spinal fusion. Postoperative course was discussed including remaining out of work for 3-6 months and full recovery being 1 year. When patient is ready to pursue surgery, then patient may follow-up.     Bobby has tried conservative measures including physical therapy and injections.  He has reached a plateau.  I did explain to him the surgical option and briefly discuss the potential benefits of the surgery.    Patient will follow-up in 6 months or sooner when ready  to pursue surgery.        Scribe Attestation      I,:  Toyin Lujan PA-C am acting as a scribe while in the presence of the attending physician.:       I,:  Roselyn Hoffman MD personally performed the services described in this documentation    as scribed in my presence.:

## 2024-04-15 ENCOUNTER — HOSPITAL ENCOUNTER (OUTPATIENT)
Dept: MRI IMAGING | Facility: HOSPITAL | Age: 69
Discharge: HOME | End: 2024-04-15
Attending: ANESTHESIOLOGY
Payer: COMMERCIAL

## 2024-04-15 DIAGNOSIS — M47.816 LUMBAR SPONDYLOSIS: ICD-10-CM

## 2024-04-15 PROCEDURE — 72148 MRI LUMBAR SPINE W/O DYE: CPT

## 2024-04-16 ENCOUNTER — TELEPHONE (OUTPATIENT)
Dept: PAIN MEDICINE | Facility: CLINIC | Age: 69
End: 2024-04-16

## 2024-04-16 ENCOUNTER — OFFICE VISIT (OUTPATIENT)
Dept: OBGYN CLINIC | Facility: CLINIC | Age: 69
End: 2024-04-16
Payer: COMMERCIAL

## 2024-04-16 VITALS
DIASTOLIC BLOOD PRESSURE: 76 MMHG | WEIGHT: 230 LBS | HEIGHT: 71 IN | BODY MASS INDEX: 32.2 KG/M2 | SYSTOLIC BLOOD PRESSURE: 142 MMHG

## 2024-04-16 DIAGNOSIS — G89.29 CHRONIC MIDLINE LOW BACK PAIN WITHOUT SCIATICA: Primary | ICD-10-CM

## 2024-04-16 DIAGNOSIS — M43.16 SPONDYLOLISTHESIS OF LUMBAR REGION: ICD-10-CM

## 2024-04-16 DIAGNOSIS — M48.062 SPINAL STENOSIS OF LUMBAR REGION WITH NEUROGENIC CLAUDICATION: ICD-10-CM

## 2024-04-16 DIAGNOSIS — M54.50 CHRONIC MIDLINE LOW BACK PAIN WITHOUT SCIATICA: Primary | ICD-10-CM

## 2024-04-16 DIAGNOSIS — M51.36 DDD (DEGENERATIVE DISC DISEASE), LUMBAR: ICD-10-CM

## 2024-04-16 PROCEDURE — 99213 OFFICE O/P EST LOW 20 MIN: CPT | Performed by: ORTHOPAEDIC SURGERY

## 2024-04-16 NOTE — TELEPHONE ENCOUNTER
L4-L5: Grade 1 anterolisthesis uncovering posterior disc margin. There is central disc extrusion with annular fissure and  about 1 cm cephalad migration. Severe bilateral facet arthropathy and ligamentum flavum thickening. There is severe bilateral   lateral recess narrowing and moderate to severe canal stenosis. Moderate to severe right and moderate left foraminal stenosis.    Above MRI findings discussed with patient via phone message with call back number left;will  be seeing ortho spine today for possible treatment options; left voicemail to follow back with our office to discuss additional treatments

## 2024-09-30 ENCOUNTER — PROCEDURE VISIT (OUTPATIENT)
Dept: UROLOGY | Facility: MEDICAL CENTER | Age: 69
End: 2024-09-30
Payer: COMMERCIAL

## 2024-09-30 ENCOUNTER — NURSE TRIAGE (OUTPATIENT)
Age: 69
End: 2024-09-30

## 2024-09-30 VITALS — WEIGHT: 230 LBS | BODY MASS INDEX: 32.2 KG/M2 | HEIGHT: 71 IN

## 2024-09-30 DIAGNOSIS — N40.0 BENIGN PROSTATIC HYPERPLASIA WITHOUT LOWER URINARY TRACT SYMPTOMS: Primary | ICD-10-CM

## 2024-09-30 LAB — POST-VOID RESIDUAL VOLUME, ML POC: 363 ML

## 2024-09-30 PROCEDURE — 51798 US URINE CAPACITY MEASURE: CPT

## 2024-09-30 PROCEDURE — 51702 INSERT TEMP BLADDER CATH: CPT

## 2024-09-30 NOTE — TELEPHONE ENCOUNTER
Patient of Dr. Coleman last seen in the [place] office called in with concerns of having difficulty urinating. States he is only able to void in small amounts and is not emptying his bladder. Reports having pelvic pressure. Denies fevers. Patient had back surgery two weeks ago. Denies constipation. Scheduled for a PVR this morning in [place].       Reason for Disposition   The patient has difficulty urinating and does not feel bladder is empty or is unable to urinate during office hours    Answer Assessment - Initial Assessment Questions  1. When was the last time you voided?   Just now on the phone but only a small amount   2. Are you straining to void?   Yes   3. Do you have any abdominal pain? If yes, can you please describe it? Do you have suprapubic or pelvic pressure?   Pelvic pressure  4. Do you have other urinary symptoms such as frequency, urgency, incontinence, dysuria?   Dysuria   5. Are your bowel movements regular?   Yes   6. Are you taking any pain medication, or have you taken any allergy or cold medication?   No   7. Have you had a recent urologic surgery or procedures?     No urologic procedures but did have back surgery two weeks ago    Protocols used: Urology-Difficulty / Unable To Void-ADULT-[place]

## 2024-09-30 NOTE — TELEPHONE ENCOUNTER
Patient's wife called stating patient had surgery two weeks ago over the weekend he was having difficulty urinating but now he cannot urinated at all. Warm transfer to triage nurse

## 2024-09-30 NOTE — PROGRESS NOTES
"9/30/2024  Bobby Kearns is a 69 y.o. male  [id number]    Diagnosis:  Chief Complaint    Benign Prostatic Hypertrophy; Urinary Retention         Patient presents for PVR s/p decreased urinary output over the weekend managed by Dr. Coleman    Plan:  Return to the office in 1 week for voiding trial.   Patient instructed to call with any questions or concerns in the meantime.    Orders Placed This Encounter   Procedures    POCT Measure PVR        Vitals:    09/30/24 0923   Weight: 104 kg (230 lb)   Height: 5' 11\" (1.803 m)           Procedure:    Universal Protocol:  procedure performed by consultantConsent: Verbal consent obtained.  Risks and benefits: risks, benefits and alternatives were discussed  Consent given by: patient  Patient understanding: patient states understanding of the procedure being performed  Patient identity confirmed: verbally with patient    Bladder catheterization    Date/Time: 9/30/2024 9:40 AM    Performed by: Kemi Johnson RN  Authorized by: Alberto Coleman MD    Patient location:  Bedside  Consent:     Consent given by:  Patient  Universal protocol:     Procedure explained and questions answered to patient or proxy's satisfaction: yes      Patient identity confirmed:  Verbally with patient  Pre-procedure details:     Procedure purpose:  Therapeutic    Preparation: Patient was prepped and draped in usual sterile fashion    Anesthesia (see MAR for exact dosages):     Anesthesia method:  Topical application    Topical anesthetic:  Lidocaine gel  Procedure details:     Bladder irrigation: no      Catheter insertion:  Indwelling    Approach: natural orifice      Catheter type:  Coude, latex and Minor    Catheter size:  16 Fr    Number of attempts:  1    Successful placement: yes      Urine characteristics:  Clear, dark and yellow  Post-procedure details:     Patient tolerance of procedure:  Tolerated well, no immediate complications  Comments:      Pt presented to the office today with concerns of " decreased urinary output, increased frequency and straining to urinate.   Pt had spinal surgery 2 weeks ago and has been having off and on symptoms since procedure.   Pt was unable to urinate while in the office. PVR performed and 363ml recorded. Spoke to CRNP in the office and recommendations were to place dobbs catheter and to maintin x 1 week. Pt was in agreement with recommendations.     Insertion site was cleansed and prepped. #16fr coude tipped catheter inserted and balloon was filled with 10cc of sterile water. Bladder was drained for 800ml of dark danae urine. Pt tolerated well and felt instant relief once catheter was inserted. Catheter was attached to leg drainage bag.   He is scheduled in the office in 1 week for voiding trial. He is aware he can contact the office with any questions or concerns he should have prior to appointment.           Kemi Johnson RN

## 2024-10-03 ENCOUNTER — APPOINTMENT (OUTPATIENT)
Dept: LAB | Facility: CLINIC | Age: 69
End: 2024-10-03
Payer: COMMERCIAL

## 2024-10-03 DIAGNOSIS — N40.0 BENIGN PROSTATIC HYPERPLASIA WITHOUT LOWER URINARY TRACT SYMPTOMS: ICD-10-CM

## 2024-10-03 LAB — PSA SERPL-MCNC: 8.13 NG/ML (ref 0–4)

## 2024-10-03 PROCEDURE — 36415 COLL VENOUS BLD VENIPUNCTURE: CPT

## 2024-10-03 PROCEDURE — 84153 ASSAY OF PSA TOTAL: CPT

## 2024-10-03 NOTE — TELEPHONE ENCOUNTER
Patient calling today about penis irritation from dobbs for last 2 days; statlock in place. Currently using neosporin cream that does not help much;  he will try neosporin ointment/ vaseline;  he reports good urine output in dobbs bag;  denies pain, hematuria, or clots;  patient confirmed visit for catheter removal on Monday;    He will complete  6-month PSA that is due    2/12/24 Dr. Coleman office note said follow-up in 1 year. Not yet scheduled

## 2024-10-07 ENCOUNTER — TELEPHONE (OUTPATIENT)
Dept: UROLOGY | Facility: MEDICAL CENTER | Age: 69
End: 2024-10-07

## 2024-10-07 ENCOUNTER — PROCEDURE VISIT (OUTPATIENT)
Dept: UROLOGY | Facility: MEDICAL CENTER | Age: 69
End: 2024-10-07
Payer: COMMERCIAL

## 2024-10-07 VITALS
DIASTOLIC BLOOD PRESSURE: 92 MMHG | HEIGHT: 71 IN | BODY MASS INDEX: 31.22 KG/M2 | SYSTOLIC BLOOD PRESSURE: 184 MMHG | HEART RATE: 79 BPM | WEIGHT: 223 LBS

## 2024-10-07 DIAGNOSIS — N13.8 BPH WITH URINARY OBSTRUCTION: Primary | ICD-10-CM

## 2024-10-07 DIAGNOSIS — N40.1 BPH WITH URINARY OBSTRUCTION: Primary | ICD-10-CM

## 2024-10-07 DIAGNOSIS — N40.0 BENIGN PROSTATIC HYPERPLASIA WITHOUT LOWER URINARY TRACT SYMPTOMS: Primary | ICD-10-CM

## 2024-10-07 DIAGNOSIS — R97.20 ELEVATED PROSTATE SPECIFIC ANTIGEN (PSA): Primary | ICD-10-CM

## 2024-10-07 DIAGNOSIS — R33.9 INCOMPLETE BLADDER EMPTYING: Primary | ICD-10-CM

## 2024-10-07 LAB — POST-VOID RESIDUAL VOLUME, ML POC: 412 ML

## 2024-10-07 PROCEDURE — 51702 INSERT TEMP BLADDER CATH: CPT

## 2024-10-07 PROCEDURE — 99024 POSTOP FOLLOW-UP VISIT: CPT

## 2024-10-07 PROCEDURE — 51798 US URINE CAPACITY MEASURE: CPT

## 2024-10-07 RX ORDER — ALFUZOSIN HYDROCHLORIDE 10 MG/1
10 TABLET, EXTENDED RELEASE ORAL DAILY
Qty: 90 TABLET | Refills: 3 | Status: SHIPPED | OUTPATIENT
Start: 2024-10-07

## 2024-10-07 NOTE — PROGRESS NOTES
"10/7/2024    Bobby Kearns  1955  [id number]    Diagnosis  Chief Complaint    Benign prostatic hyperplasia without lower urinary tract sy         Patient presents for dobbs removal/void trial managed by Dr. Coleman.     Pt experienced decreased urinary output, increased frequency and straining to urinate s/p spinal surgery about 3 weeks ago and has been having off and on symptoms since procedure.    Plan    Return this afternoon for PVR    Procedure Dobbs removal/voiding trial    Dobbs catheter removed after deflation of an intact balloon. Patient tolerated well. Encouraged patient to hydrate well and return this afternoon for post void residual.  he knows he may return early if uncomfortable and unable to urinate. Patient agrees to this plan.    Patient returned this afternoon. Pt voided in office.  PVR performed measuring 412 mls.  Dr. Coleman spoke to pt and wife and advised dobbs placement to return in one week for another void trial.      Dobbs placed.  Pt will start tamsulosin      Vitals:    10/07/24 0831   BP: (!) 184/92   Pulse: 79   Weight: 101 kg (223 lb)   Height: 5' 11\" (1.803 m)           Jessie Love RN       "

## 2024-10-07 NOTE — PROGRESS NOTES
"10/7/2024  Bobby Kearns is a 69 y.o. male  [id number]    Diagnosis:  Chief Complaint    Incomplete Bladder Emptying due to BPH       Vitals:    10/07/24 0831   BP: (!) 184/92   Pulse: 79   Weight: 101 kg (223 lb)   Height: 5' 11\" (1.803 m)     Procedure:    Universal Protocol:  procedure performed by consultantConsent: Verbal consent obtained.  Risks and benefits: risks, benefits and alternatives were discussed  Consent given by: patient  Patient understanding: patient states understanding of the procedure being performed  Patient identity confirmed: verbally with patient    Bladder catheterization    Date/Time: 10/7/2024 8:30 AM    Performed by: Jessie Love RN  Authorized by: Alberto Coleman MD    Patient location:  Other (comment)  Consent:     Consent given by:  Patient  Universal protocol:     Procedure explained and questions answered to patient or proxy's satisfaction: yes      Patient identity confirmed:  Verbally with patient  Pre-procedure details:     Procedure purpose:  Therapeutic    Preparation: Patient was prepped and draped in usual sterile fashion    Anesthesia (see MAR for exact dosages):     Anesthesia method:  Topical application    Topical anesthetic:  Lidocaine gel  Procedure details:     Bladder irrigation: no      Catheter insertion:  Temporary indwelling    Approach: natural orifice      Catheter type:  Coude, Minor and latex    Catheter size:  16 Fr    Number of attempts:  1    Successful placement: yes      Urine characteristics:  Clear and yellow  Post-procedure details:     Patient tolerance of procedure:  Tolerated well, no immediate complications  Comments:       Patient prepped with Betadine and 16 fr. coude catheter inserted using sterile technique. Urine return clear yellow.   10 mls of sterile water inflated into balloon.  Patient tolerated procedure well.  Meatus site clean and free of debris, no irritation or redness noted. StatLock applied, catheter attached to leg bag.   " Patient denies fevers or urinary symptoms. Patient encouraged to maintain adequate fluid intake. Minor attached to leg bag.  No stat lock placed.          Jessie Love RN

## 2024-10-14 ENCOUNTER — TELEPHONE (OUTPATIENT)
Dept: UROLOGY | Facility: MEDICAL CENTER | Age: 69
End: 2024-10-14

## 2024-10-14 ENCOUNTER — PROCEDURE VISIT (OUTPATIENT)
Dept: UROLOGY | Facility: MEDICAL CENTER | Age: 69
End: 2024-10-14
Payer: COMMERCIAL

## 2024-10-14 VITALS
BODY MASS INDEX: 31.5 KG/M2 | SYSTOLIC BLOOD PRESSURE: 176 MMHG | DIASTOLIC BLOOD PRESSURE: 92 MMHG | WEIGHT: 225 LBS | HEIGHT: 71 IN | HEART RATE: 82 BPM

## 2024-10-14 DIAGNOSIS — R33.9 INCOMPLETE BLADDER EMPTYING: Primary | ICD-10-CM

## 2024-10-14 DIAGNOSIS — C61 PROSTATE CANCER (HCC): Primary | ICD-10-CM

## 2024-10-14 LAB — POST-VOID RESIDUAL VOLUME, ML POC: 301 ML

## 2024-10-14 PROCEDURE — 51798 US URINE CAPACITY MEASURE: CPT

## 2024-10-14 PROCEDURE — 99024 POSTOP FOLLOW-UP VISIT: CPT

## 2024-10-14 NOTE — PROGRESS NOTES
"10/14/2024    Bobby Kearns  1955  [id number]    Diagnosis  Chief Complaint    Incomplete Bladder Emptying         Patient presents for 2nd Attempt dobbs removal/void trial  managed by Dr. Coleman.  .     Plan  Return this afternoon for PVR    Procedure Dobbs removal/voiding trial    Dobbs catheter removed after deflation of an intact balloon. Patient tolerated well. Encouraged patient to hydrate well and return this afternoon for post void residual.  he knows he may return early if uncomfortable and unable to urinate. Patient agrees to this plan.    Patient returned this afternoon.  Pt voided in the office.  PVR performed measuring 301 mls.  Pt stated he has been voiding with a stream since dobbs removal this morning and felt good.  Dr. Coleman was consulted and stated pt could go home without dobbs.  Pt will contact the office if he feels he is not emptying well.    Vitals:    10/14/24 0829   BP: (!) 176/92   Pulse: 82   Weight: 102 kg (225 lb)   Height: 5' 11\" (1.803 m)           Jessie Love RN       "

## 2024-11-06 ENCOUNTER — HOSPITAL ENCOUNTER (OUTPATIENT)
Dept: NUCLEAR MEDICINE | Facility: HOSPITAL | Age: 69
Discharge: HOME | End: 2024-11-06
Attending: UROLOGY
Payer: COMMERCIAL

## 2024-11-06 DIAGNOSIS — R97.20 ELEVATED PROSTATE SPECIFIC ANTIGEN (PSA): Primary | ICD-10-CM

## 2024-11-06 DIAGNOSIS — C61 PROSTATE CANCER (HCC): ICD-10-CM

## 2024-11-06 PROCEDURE — 78815 PET IMAGE W/CT SKULL-THIGH: CPT

## 2024-11-06 PROCEDURE — A9595 HB PIFLUFOLASTAT F-18, DIAGNOSTIC, 1 MILLICURIE: HCPCS

## 2024-11-22 ENCOUNTER — APPOINTMENT (OUTPATIENT)
Dept: LAB | Facility: CLINIC | Age: 69
End: 2024-11-22
Payer: COMMERCIAL

## 2024-11-22 DIAGNOSIS — R97.20 ELEVATED PROSTATE SPECIFIC ANTIGEN (PSA): ICD-10-CM

## 2024-11-22 LAB — PSA SERPL-MCNC: 7.72 NG/ML (ref 0–4)

## 2024-11-22 PROCEDURE — 84153 ASSAY OF PSA TOTAL: CPT

## 2024-11-22 PROCEDURE — 36415 COLL VENOUS BLD VENIPUNCTURE: CPT

## 2024-11-25 ENCOUNTER — NURSE TRIAGE (OUTPATIENT)
Age: 69
End: 2024-11-25

## 2024-11-25 ENCOUNTER — TELEPHONE (OUTPATIENT)
Dept: UROLOGY | Facility: MEDICAL CENTER | Age: 69
End: 2024-11-25

## 2024-11-25 ENCOUNTER — APPOINTMENT (OUTPATIENT)
Dept: LAB | Facility: CLINIC | Age: 69
End: 2024-11-25
Payer: COMMERCIAL

## 2024-11-25 DIAGNOSIS — R30.0 DYSURIA: ICD-10-CM

## 2024-11-25 DIAGNOSIS — R97.20 ELEVATED PROSTATE SPECIFIC ANTIGEN (PSA): Primary | ICD-10-CM

## 2024-11-25 DIAGNOSIS — R35.0 URINARY FREQUENCY: ICD-10-CM

## 2024-11-25 DIAGNOSIS — R35.0 URINARY FREQUENCY: Primary | ICD-10-CM

## 2024-11-25 PROBLEM — G47.33 OSA (OBSTRUCTIVE SLEEP APNEA): Status: ACTIVE | Noted: 2018-12-27

## 2024-11-25 PROBLEM — E78.00 PURE HYPERCHOLESTEROLEMIA, UNSPECIFIED: Status: ACTIVE | Noted: 2023-12-01

## 2024-11-25 PROBLEM — K76.0 HEPATIC STEATOSIS: Status: ACTIVE | Noted: 2023-05-05

## 2024-11-25 PROBLEM — R39.198 DIFFICULTY VOIDING: Status: ACTIVE | Noted: 2019-01-29

## 2024-11-25 PROBLEM — R16.2 HEPATOSPLENOMEGALY: Status: ACTIVE | Noted: 2022-10-20

## 2024-11-25 LAB
BACTERIA UR QL AUTO: ABNORMAL /HPF
BILIRUB UR QL STRIP: NEGATIVE
CLARITY UR: ABNORMAL
COLOR UR: ABNORMAL
GLUCOSE UR STRIP-MCNC: NEGATIVE MG/DL
HGB UR QL STRIP.AUTO: NEGATIVE
KETONES UR STRIP-MCNC: NEGATIVE MG/DL
LEUKOCYTE ESTERASE UR QL STRIP: ABNORMAL
MUCOUS THREADS UR QL AUTO: ABNORMAL
NITRITE UR QL STRIP: POSITIVE
NON-SQ EPI CELLS URNS QL MICRO: ABNORMAL /HPF
PH UR STRIP.AUTO: 6 [PH]
PROT UR STRIP-MCNC: ABNORMAL MG/DL
RBC #/AREA URNS AUTO: ABNORMAL /HPF
SP GR UR STRIP.AUTO: 1.02 (ref 1–1.03)
UROBILINOGEN UR STRIP-ACNC: <2 MG/DL
WBC #/AREA URNS AUTO: ABNORMAL /HPF

## 2024-11-25 PROCEDURE — 87186 SC STD MICRODIL/AGAR DIL: CPT

## 2024-11-25 PROCEDURE — 81001 URINALYSIS AUTO W/SCOPE: CPT

## 2024-11-25 PROCEDURE — 87077 CULTURE AEROBIC IDENTIFY: CPT

## 2024-11-25 PROCEDURE — 87086 URINE CULTURE/COLONY COUNT: CPT

## 2024-11-25 NOTE — TELEPHONE ENCOUNTER
Reason for Disposition   The patient has an unknown case of mild dysuria    Answer Assessment - Initial Assessment Questions  1. When did your symptoms start?   Last week   2. Do you experience pain or burning with every void?   yes  3. Do you have any other urinary symptoms such as urinary frequency, urgency, incontinence, blood in your urine?   Frequency   4. Do you have any abdominal pain or flank pain?  no  5. Do you have a fever of 101 or higher? How did you take your temperature?   no  6. Does your urine stream spray? (Specifically for males)   no  7. Have you become unable to urinate?   no  Reviewed bladder irritants to avoid, and to stay hydrated with at least 64 oz. Of water/day as long as no fluid restrictions. ED precautions reviewed as well.    Protocols used: Urology-Dysuria-ADULT-[place]

## 2024-11-25 NOTE — TELEPHONE ENCOUNTER
Spoke to pt.  He was advised of note from Dr. Coleman.  He was given number for central scheduling.  Will postpone for results and OV if needed to discuss    ----- Message from Alberto Coleman MD sent at 11/25/2024  1:15 PM EST -----   Persistently high PSA.  He had I discussed MRI, please order.  PET scan before was not adequate imaging of the prostate to detect cancer

## 2024-11-26 RX ORDER — NITROFURANTOIN 25; 75 MG/1; MG/1
100 CAPSULE ORAL 2 TIMES DAILY
Qty: 14 CAPSULE | Refills: 0 | Status: SHIPPED | OUTPATIENT
Start: 2024-11-26 | End: 2024-12-03

## 2024-11-26 NOTE — TELEPHONE ENCOUNTER
Prescription for macrobid x7 days sent to [facility] pharmacy in [place] based on UA results. Will monitor for urine culture and contact patient if antibiotic needs to be changed.

## 2024-11-26 NOTE — TELEPHONE ENCOUNTER
Call placed. Spoke to pt and left him know that macrobid x 7 days was sent over to his preferred pharmacy. Advised pt that we would be monitoring UC to determine if ABX needs to be adjusted. Pt understood. Advised if any other questions or concerns to call our office.

## 2024-11-27 LAB — BACTERIA UR CULT: ABNORMAL

## 2024-11-27 RX ORDER — CIPROFLOXACIN 500 MG/1
500 TABLET, FILM COATED ORAL EVERY 12 HOURS SCHEDULED
Qty: 14 TABLET | Refills: 0 | Status: SHIPPED | OUTPATIENT
Start: 2024-11-27 | End: 2024-12-04

## 2024-11-27 NOTE — TELEPHONE ENCOUNTER
Called patient - LMOM regarding needing to change the antibiotics.  He is asked to call back if he has any further questions or concerns.

## 2024-11-27 NOTE — TELEPHONE ENCOUNTER
Urine culture positive for infection.  Macrobid does not cover organism that is listed on the culture.  Plan to discontinue Macrobid and start patient on Cipro for 7 days.  Antibiotic sent.  Please notify patient.

## 2024-12-16 ENCOUNTER — HOSPITAL ENCOUNTER (OUTPATIENT)
Dept: MRI IMAGING | Facility: HOSPITAL | Age: 69
Discharge: HOME | End: 2024-12-16
Payer: COMMERCIAL

## 2024-12-16 DIAGNOSIS — R97.20 ELEVATED PROSTATE SPECIFIC ANTIGEN (PSA): ICD-10-CM

## 2024-12-16 PROCEDURE — 76377 3D RENDER W/INTRP POSTPROCES: CPT

## 2024-12-16 PROCEDURE — A9585 GADOBUTROL INJECTION: HCPCS | Performed by: UROLOGY

## 2024-12-16 PROCEDURE — 72197 MRI PELVIS W/O & W/DYE: CPT

## 2024-12-16 RX ORDER — GADOBUTROL 604.72 MG/ML
10 INJECTION INTRAVENOUS
Status: COMPLETED | OUTPATIENT
Start: 2024-12-16 | End: 2024-12-16

## 2024-12-16 RX ADMIN — GADOBUTROL 10 ML: 604.72 INJECTION INTRAVENOUS at 13:32

## (undated) DEVICE — CHLORAPREP HI-LITE 10.5ML ORANGE

## (undated) DEVICE — Device

## (undated) DEVICE — NEEDLE BLUNT 18 G X 1 1/2IN

## (undated) DEVICE — NEEDLE 25G X 1 1/2

## (undated) DEVICE — GAUZE SPONGES,USP TYPE VII GAUZE, 12 PLY: Brand: CURITY

## (undated) DEVICE — NEEDLE 18 G X 1 1/2 SAFETY

## (undated) DEVICE — SYRINGE 5ML LL

## (undated) DEVICE — DRAPE SHEET THREE QUARTER

## (undated) DEVICE — FLEXIBLE ADHESIVE BANDAGE,X-LARGE: Brand: CURITY

## (undated) DEVICE — GLOVE SRG LF STRL BGL SKNSNS 7.5 PF

## (undated) DEVICE — IV EXTENSION TUBING SMALL BORE

## (undated) DEVICE — DRAPE TOWEL: Brand: CONVERTORS

## (undated) DEVICE — STERILE LATEX POWDER-FREE SURGICAL GLOVESWITH NITRILE COATING: Brand: PROTEXIS

## (undated) DEVICE — PLASTIC ADHESIVE BANDAGE: Brand: CURITY

## (undated) DEVICE — GAUZE SPONGES,16 PLY: Brand: CURITY

## (undated) DEVICE — UTILITY MARKER,BLACK WITH LABELS: Brand: DEVON

## (undated) DEVICE — SKIN MARKER DUAL TIP WITH RULER CAP, FLEXIBLE RULER AND LABELS: Brand: DEVON

## (undated) DEVICE — TRAY EPID CONT PERIFIX 18G X 3.5IN 10ML CLSD TIP

## (undated) DEVICE — NEEDLE SPINAL 22G X 3.5IN  QUINCKE

## (undated) DEVICE — ELECTRODE RF 10CM

## (undated) DEVICE — SYRINGE 10ML LL

## (undated) DEVICE — TELFA ADHESIVE ISLAND DRESSING: Brand: TELFA

## (undated) DEVICE — SYRINGE LOR 8ML PLASTIC LL

## (undated) DEVICE — PAD GROUNDING IONIC RF DISP